# Patient Record
Sex: FEMALE | Race: WHITE | NOT HISPANIC OR LATINO | Employment: FULL TIME | URBAN - NONMETROPOLITAN AREA
[De-identification: names, ages, dates, MRNs, and addresses within clinical notes are randomized per-mention and may not be internally consistent; named-entity substitution may affect disease eponyms.]

---

## 2017-02-23 ENCOUNTER — HOSPITAL ENCOUNTER (EMERGENCY)
Facility: HOSPITAL | Age: 32
Discharge: HOME | End: 2017-02-23
Attending: EMERGENCY MEDICINE | Admitting: EMERGENCY MEDICINE

## 2017-02-23 VITALS
BODY MASS INDEX: 33.13 KG/M2 | DIASTOLIC BLOOD PRESSURE: 85 MMHG | HEART RATE: 90 BPM | WEIGHT: 180 LBS | RESPIRATION RATE: 18 BRPM | HEIGHT: 62 IN | OXYGEN SATURATION: 97 % | SYSTOLIC BLOOD PRESSURE: 127 MMHG | TEMPERATURE: 100.4 F

## 2017-02-23 DIAGNOSIS — J10.1 INFLUENZA A: Primary | ICD-10-CM

## 2017-02-23 PROCEDURE — 87804 INFLUENZA ASSAY W/OPTIC: CPT | Performed by: NURSE PRACTITIONER

## 2017-02-23 PROCEDURE — 99283 EMERGENCY DEPT VISIT LOW MDM: CPT

## 2017-02-23 RX ORDER — CLONIDINE HYDROCHLORIDE 0.1 MG/1
0.1 TABLET ORAL AS NEEDED
COMMUNITY
Start: 2016-04-11 | End: 2018-09-20

## 2017-02-23 RX ORDER — CITALOPRAM 40 MG/1
40 TABLET ORAL DAILY
COMMUNITY
Start: 2016-04-11 | End: 2019-05-08

## 2017-02-23 RX ORDER — OSELTAMIVIR PHOSPHATE 75 MG/1
75 CAPSULE ORAL 2 TIMES DAILY
Qty: 10 CAPSULE | Refills: 0 | Status: SHIPPED | OUTPATIENT
Start: 2017-02-23 | End: 2018-09-20

## 2017-02-23 RX ORDER — LORATADINE 10 MG/1
10 TABLET ORAL AS NEEDED
COMMUNITY
Start: 2016-04-11 | End: 2018-09-20

## 2017-02-23 RX ORDER — OSELTAMIVIR PHOSPHATE 75 MG/1
75 CAPSULE ORAL EVERY 12 HOURS SCHEDULED
Status: DISCONTINUED | OUTPATIENT
Start: 2017-02-23 | End: 2017-02-23

## 2017-02-23 RX ORDER — ALBUTEROL SULFATE 90 UG/1
2 AEROSOL, METERED RESPIRATORY (INHALATION) AS NEEDED
COMMUNITY
Start: 2016-04-11 | End: 2018-09-20

## 2017-02-23 RX ORDER — IBUPROFEN 400 MG/1
400 TABLET ORAL ONCE
Status: COMPLETED | OUTPATIENT
Start: 2017-02-23 | End: 2017-02-23

## 2017-02-23 RX ORDER — IBUPROFEN 600 MG/1
600 TABLET ORAL EVERY 6 HOURS PRN
Qty: 20 TABLET | Refills: 0 | Status: SHIPPED | OUTPATIENT
Start: 2017-02-23 | End: 2018-10-15

## 2017-02-23 RX ADMIN — IBUPROFEN 400 MG: 400 TABLET ORAL at 15:56

## 2017-03-03 LAB
FLUAV AG NPH QL: POSITIVE
FLUBV AG NPH QL IA: NEGATIVE

## 2018-10-15 ENCOUNTER — APPOINTMENT (OUTPATIENT)
Dept: GENERAL RADIOLOGY | Facility: HOSPITAL | Age: 33
End: 2018-10-15

## 2018-10-15 ENCOUNTER — HOSPITAL ENCOUNTER (EMERGENCY)
Facility: HOSPITAL | Age: 33
Discharge: HOME | End: 2018-10-15
Attending: EMERGENCY MEDICINE | Admitting: EMERGENCY MEDICINE

## 2018-10-15 VITALS
OXYGEN SATURATION: 96 % | RESPIRATION RATE: 16 BRPM | DIASTOLIC BLOOD PRESSURE: 87 MMHG | WEIGHT: 215.2 LBS | TEMPERATURE: 97.4 F | BODY MASS INDEX: 39.6 KG/M2 | HEIGHT: 62 IN | SYSTOLIC BLOOD PRESSURE: 122 MMHG | HEART RATE: 71 BPM

## 2018-10-15 DIAGNOSIS — J20.9 ACUTE BRONCHITIS, UNSPECIFIED ORGANISM: Primary | ICD-10-CM

## 2018-10-15 DIAGNOSIS — R09.1 PLEURISY: ICD-10-CM

## 2018-10-15 LAB
ANION GAP SERPL CALCULATED.3IONS-SCNC: 3.8 MMOL/L (ref 10–20)
B-HCG UR QL: NEGATIVE
BASOPHILS # BLD AUTO: 0.1 10*3/MM3 (ref 0–0.2)
BASOPHILS NFR BLD AUTO: 1.2 % (ref 0–2.5)
BUN BLD-MCNC: 13 MG/DL (ref 7–20)
BUN/CREAT SERPL: 21.7 (ref 7.1–23.5)
CALCIUM SPEC-SCNC: 8.3 MG/DL (ref 8.4–10.2)
CHLORIDE SERPL-SCNC: 110 MMOL/L (ref 98–107)
CO2 SERPL-SCNC: 26 MMOL/L (ref 26–30)
CREAT BLD-MCNC: 0.6 MG/DL (ref 0.6–1.3)
D-DIMER, QUANTITATIVE (MAD,POW, STR): 227 NG/ML (FEU) (ref 0–500)
DEPRECATED RDW RBC AUTO: 43.4 FL (ref 37–54)
EOSINOPHIL # BLD AUTO: 1.03 10*3/MM3 (ref 0–0.7)
EOSINOPHIL NFR BLD AUTO: 11.9 % (ref 0–7)
ERYTHROCYTE [DISTWIDTH] IN BLOOD BY AUTOMATED COUNT: 12.5 % (ref 11.5–14.5)
GFR SERPL CREATININE-BSD FRML MDRD: 114 ML/MIN/1.73
GLUCOSE BLD-MCNC: 95 MG/DL (ref 74–98)
HCT VFR BLD AUTO: 41.2 % (ref 37–47)
HGB BLD-MCNC: 13.6 G/DL (ref 12–16)
IMM GRANULOCYTES # BLD: 0.05 10*3/MM3 (ref 0–0.06)
IMM GRANULOCYTES NFR BLD: 0.6 % (ref 0–0.6)
LYMPHOCYTES # BLD AUTO: 1.98 10*3/MM3 (ref 0.6–3.4)
LYMPHOCYTES NFR BLD AUTO: 22.9 % (ref 10–50)
MCH RBC QN AUTO: 31.4 PG (ref 27–31)
MCHC RBC AUTO-ENTMCNC: 33 G/DL (ref 30–37)
MCV RBC AUTO: 95.2 FL (ref 81–99)
MONOCYTES # BLD AUTO: 0.39 10*3/MM3 (ref 0–0.9)
MONOCYTES NFR BLD AUTO: 4.5 % (ref 0–12)
NEUTROPHILS # BLD AUTO: 5.11 10*3/MM3 (ref 2–6.9)
NEUTROPHILS NFR BLD AUTO: 58.9 % (ref 37–80)
NRBC BLD MANUAL-RTO: 0 /100 WBC (ref 0–0)
PLATELET # BLD AUTO: 188 10*3/MM3 (ref 130–400)
PMV BLD AUTO: 10.7 FL (ref 6–12)
POTASSIUM BLD-SCNC: 3.8 MMOL/L (ref 3.5–5.1)
RBC # BLD AUTO: 4.33 10*6/MM3 (ref 4.2–5.4)
SODIUM BLD-SCNC: 136 MMOL/L (ref 137–145)
WBC NRBC COR # BLD: 8.66 10*3/MM3 (ref 4.8–10.8)

## 2018-10-15 PROCEDURE — 81025 URINE PREGNANCY TEST: CPT | Performed by: PHYSICIAN ASSISTANT

## 2018-10-15 PROCEDURE — 71046 X-RAY EXAM CHEST 2 VIEWS: CPT

## 2018-10-15 PROCEDURE — 99283 EMERGENCY DEPT VISIT LOW MDM: CPT

## 2018-10-15 PROCEDURE — 85379 FIBRIN DEGRADATION QUANT: CPT | Performed by: PHYSICIAN ASSISTANT

## 2018-10-15 PROCEDURE — 85025 COMPLETE CBC W/AUTO DIFF WBC: CPT | Performed by: PHYSICIAN ASSISTANT

## 2018-10-15 PROCEDURE — 80048 BASIC METABOLIC PNL TOTAL CA: CPT | Performed by: PHYSICIAN ASSISTANT

## 2018-10-15 RX ORDER — PREDNISONE 20 MG/1
20 TABLET ORAL 2 TIMES DAILY
Qty: 4 TABLET | Refills: 0 | Status: SHIPPED | OUTPATIENT
Start: 2018-10-15 | End: 2019-05-08

## 2018-10-15 RX ORDER — DOXYCYCLINE 100 MG/1
100 CAPSULE ORAL 2 TIMES DAILY
Qty: 14 CAPSULE | Refills: 0 | Status: SHIPPED | OUTPATIENT
Start: 2018-10-15 | End: 2019-05-08

## 2018-10-15 RX ORDER — IPRATROPIUM BROMIDE AND ALBUTEROL SULFATE 2.5; .5 MG/3ML; MG/3ML
3 SOLUTION RESPIRATORY (INHALATION) ONCE
Status: DISCONTINUED | OUTPATIENT
Start: 2018-10-15 | End: 2018-10-15 | Stop reason: HOSPADM

## 2018-10-15 RX ORDER — CETIRIZINE HYDROCHLORIDE 10 MG/1
10 TABLET ORAL DAILY
Qty: 14 TABLET | Refills: 0 | Status: SHIPPED | OUTPATIENT
Start: 2018-10-15 | End: 2019-05-08

## 2018-10-15 RX ORDER — SODIUM CHLORIDE 0.9 % (FLUSH) 0.9 %
10 SYRINGE (ML) INJECTION AS NEEDED
Status: DISCONTINUED | OUTPATIENT
Start: 2018-10-15 | End: 2018-10-15 | Stop reason: HOSPADM

## 2018-10-15 NOTE — ED PROVIDER NOTES
Subjective   The patient is here with complaint of cough some inspiratory pain associated for the past few weeks no fevers does endorse history of some asthma no chest pain patient describes pleuritic type symptoms productive cough some yellow sputum no hemoptysis reported no sharp abdominal pain denies pregnancy presents here for further evaluation        History provided by:  Patient      Review of Systems   Constitutional: Negative.    HENT: Positive for congestion and rhinorrhea.    Eyes: Negative.    Respiratory: Positive for cough and wheezing. Negative for shortness of breath and stridor.    Cardiovascular: Negative.  Negative for leg swelling.   Gastrointestinal: Negative.    Genitourinary: Negative.    Musculoskeletal: Negative.    Skin: Negative.    Neurological: Negative.    Psychiatric/Behavioral: The patient is nervous/anxious.    All other systems reviewed and are negative.      Past Medical History:   Diagnosis Date   • ADD (attention deficit disorder)    • COPD (chronic obstructive pulmonary disease) (CMS/[facility])    • Depression    • Hemorrhoids    • Skin tags, multiple acquired        No Known Allergies    Past Surgical History:   Procedure Laterality Date   • TONSILECTOMY, ADENOIDECTOMY, BILATERAL MYRINGOTOMY AND TUBES     • TUBAL ABDOMINAL LIGATION         Family History   Problem Relation Age of Onset   • No Known Problems Mother    • No Known Problems Father        Social History     Social History   • Marital status: Single     Social History Main Topics   • Smoking status: Current Every Day Smoker     Packs/day: 1.00     Types: Cigarettes   • Smokeless tobacco: Never Used   • Alcohol use No   • Drug use: Yes     Types: Marijuana   • Sexual activity: Defer     Other Topics Concern   • Not on file           Objective   Physical Exam   Constitutional: She is oriented to person, place, and time. She appears well-developed and well-nourished. No distress.   Afebrile vital signs stable ambulatory in  exam room   HENT:   Head: Normocephalic and atraumatic.   Mouth/Throat: Oropharynx is clear and moist.   Eyes: Pupils are equal, round, and reactive to light. Conjunctivae and EOM are normal.   Neck: Normal range of motion. Neck supple. No tracheal deviation present.   Cardiovascular: Normal rate, regular rhythm and intact distal pulses.    Pulmonary/Chest: Effort normal. She has wheezes.   Some slight expiratory wheezes   Abdominal: Soft. There is no tenderness.   Musculoskeletal: Normal range of motion. She exhibits no edema.   Lymphadenopathy:     She has no cervical adenopathy.   Neurological: She is alert and oriented to person, place, and time. No cranial nerve deficit or sensory deficit. She exhibits normal muscle tone. Coordination normal.   Skin: Skin is warm and dry. Capillary refill takes less than 2 seconds. No rash noted. She is not diaphoretic.   Psychiatric: She has a normal mood and affect. Her behavior is normal. Judgment and thought content normal.   Nursing note and vitals reviewed.      Procedures           ED Course  ED Course as of Oct 15 1434   Mon Oct 15, 2018   1406 Patient resting comfortably no acute distress  [SC]   1427 D-dimer within normal limits chest x-ray unremarkable per radiology will treat with antibiotics cough medication follow-up with PCP return here for any problems concerns otherwise  [SC]      ED Course User Index  [SC] Delonte Carballo PA-C                  [facility]      Final diagnoses:   Acute bronchitis, unspecified organism   Pleurisy            Delonte Carballo PA-C  10/15/18 1434

## 2018-10-15 NOTE — ED NOTES
Pt states she doesn't want to have lab work drawn or neb treatment. Pt states is something is wrong with chest xray then she will have lab work done. FRANCOIS Carballo notified      Monse Belcher, RN  10/15/18 9416

## 2019-06-23 VITALS
HEART RATE: 87 BPM | SYSTOLIC BLOOD PRESSURE: 141 MMHG | BODY MASS INDEX: 43.24 KG/M2 | DIASTOLIC BLOOD PRESSURE: 90 MMHG | HEIGHT: 62 IN | TEMPERATURE: 98.4 F | RESPIRATION RATE: 20 BRPM | WEIGHT: 235 LBS | OXYGEN SATURATION: 96 %

## 2019-06-23 PROCEDURE — 99283 EMERGENCY DEPT VISIT LOW MDM: CPT

## 2019-06-24 ENCOUNTER — HOSPITAL ENCOUNTER (EMERGENCY)
Facility: HOSPITAL | Age: 34
Discharge: HOME | End: 2019-06-24
Attending: EMERGENCY MEDICINE | Admitting: EMERGENCY MEDICINE

## 2019-06-24 DIAGNOSIS — J32.0 LEFT MAXILLARY SINUSITIS: ICD-10-CM

## 2019-06-24 DIAGNOSIS — L23.7 POISON IVY DERMATITIS: Primary | ICD-10-CM

## 2019-06-24 PROCEDURE — 63710000001 PREDNISONE PER 5 MG: Performed by: EMERGENCY MEDICINE

## 2019-06-24 RX ORDER — AMOXICILLIN 875 MG/1
875 TABLET, COATED ORAL 2 TIMES DAILY
Qty: 14 TABLET | Refills: 0 | Status: SHIPPED | OUTPATIENT
Start: 2019-06-24 | End: 2019-09-04

## 2019-06-24 RX ORDER — PREDNISONE 20 MG/1
TABLET ORAL
Qty: 10 TABLET | Refills: 0 | Status: SHIPPED | OUTPATIENT
Start: 2019-06-24 | End: 2019-09-04

## 2019-06-24 RX ORDER — AMOXICILLIN 500 MG/1
500 CAPSULE ORAL ONCE
Status: COMPLETED | OUTPATIENT
Start: 2019-06-24 | End: 2019-06-24

## 2019-06-24 RX ADMIN — PREDNISONE 60 MG: 10 TABLET ORAL at 01:12

## 2019-06-24 RX ADMIN — AMOXICILLIN 500 MG: 500 CAPSULE ORAL at 01:12

## 2019-09-04 ENCOUNTER — APPOINTMENT (OUTPATIENT)
Dept: GENERAL RADIOLOGY | Facility: HOSPITAL | Age: 34
End: 2019-09-04

## 2019-09-04 ENCOUNTER — HOSPITAL ENCOUNTER (EMERGENCY)
Facility: HOSPITAL | Age: 34
Discharge: HOME | End: 2019-09-04
Attending: EMERGENCY MEDICINE | Admitting: EMERGENCY MEDICINE

## 2019-09-04 VITALS
SYSTOLIC BLOOD PRESSURE: 126 MMHG | BODY MASS INDEX: 42.33 KG/M2 | OXYGEN SATURATION: 95 % | DIASTOLIC BLOOD PRESSURE: 84 MMHG | TEMPERATURE: 97.6 F | HEART RATE: 78 BPM | WEIGHT: 230 LBS | HEIGHT: 62 IN | RESPIRATION RATE: 18 BRPM

## 2019-09-04 DIAGNOSIS — J44.0 COPD (CHRONIC OBSTRUCTIVE PULMONARY DISEASE) WITH ACUTE BRONCHITIS (HCC): ICD-10-CM

## 2019-09-04 DIAGNOSIS — J20.9 ACUTE BRONCHITIS, UNSPECIFIED ORGANISM: Primary | ICD-10-CM

## 2019-09-04 DIAGNOSIS — J20.9 COPD (CHRONIC OBSTRUCTIVE PULMONARY DISEASE) WITH ACUTE BRONCHITIS (HCC): ICD-10-CM

## 2019-09-04 LAB
ALBUMIN SERPL-MCNC: 4 G/DL (ref 3.5–5.2)
ALBUMIN/GLOB SERPL: 1.3 G/DL
ALP SERPL-CCNC: 62 U/L (ref 39–117)
ALT SERPL W P-5'-P-CCNC: 9 U/L (ref 1–33)
ANION GAP SERPL CALCULATED.3IONS-SCNC: 13.7 MMOL/L (ref 5–15)
AST SERPL-CCNC: 11 U/L (ref 1–32)
B-HCG UR QL: NEGATIVE
BASOPHILS # BLD AUTO: 0.06 10*3/MM3 (ref 0–0.2)
BASOPHILS NFR BLD AUTO: 0.7 % (ref 0–1.5)
BILIRUB SERPL-MCNC: 0.3 MG/DL (ref 0.2–1.2)
BUN BLD-MCNC: 11 MG/DL (ref 6–20)
BUN/CREAT SERPL: 14.7 (ref 7–25)
CALCIUM SPEC-SCNC: 8.5 MG/DL (ref 8.6–10.5)
CHLORIDE SERPL-SCNC: 105 MMOL/L (ref 98–107)
CO2 SERPL-SCNC: 21.3 MMOL/L (ref 22–29)
CREAT BLD-MCNC: 0.75 MG/DL (ref 0.57–1)
D DIMER PPP FEU-MCNC: <0.27 MCGFEU/ML (ref 0–0.57)
DEPRECATED RDW RBC AUTO: 43.4 FL (ref 37–54)
EOSINOPHIL # BLD AUTO: 0.69 10*3/MM3 (ref 0–0.4)
EOSINOPHIL NFR BLD AUTO: 7.6 % (ref 0.3–6.2)
ERYTHROCYTE [DISTWIDTH] IN BLOOD BY AUTOMATED COUNT: 12.7 % (ref 12.3–15.4)
GFR SERPL CREATININE-BSD FRML MDRD: 88 ML/MIN/1.73
GLOBULIN UR ELPH-MCNC: 3.2 GM/DL
GLUCOSE BLD-MCNC: 101 MG/DL (ref 65–99)
HCT VFR BLD AUTO: 42.4 % (ref 34–46.6)
HGB BLD-MCNC: 13.8 G/DL (ref 12–15.9)
IMM GRANULOCYTES # BLD AUTO: 0.05 10*3/MM3 (ref 0–0.05)
IMM GRANULOCYTES NFR BLD AUTO: 0.6 % (ref 0–0.5)
LYMPHOCYTES # BLD AUTO: 2.35 10*3/MM3 (ref 0.7–3.1)
LYMPHOCYTES NFR BLD AUTO: 25.9 % (ref 19.6–45.3)
MCH RBC QN AUTO: 30.1 PG (ref 26.6–33)
MCHC RBC AUTO-ENTMCNC: 32.5 G/DL (ref 31.5–35.7)
MCV RBC AUTO: 92.6 FL (ref 79–97)
MONOCYTES # BLD AUTO: 0.42 10*3/MM3 (ref 0.1–0.9)
MONOCYTES NFR BLD AUTO: 4.6 % (ref 5–12)
NEUTROPHILS # BLD AUTO: 5.52 10*3/MM3 (ref 1.7–7)
NEUTROPHILS NFR BLD AUTO: 60.6 % (ref 42.7–76)
NRBC BLD AUTO-RTO: 0 /100 WBC (ref 0–0.2)
PLATELET # BLD AUTO: 223 10*3/MM3 (ref 140–450)
PMV BLD AUTO: 10.6 FL (ref 6–12)
POTASSIUM BLD-SCNC: 4 MMOL/L (ref 3.5–5.2)
PROT SERPL-MCNC: 7.2 G/DL (ref 6–8.5)
RBC # BLD AUTO: 4.58 10*6/MM3 (ref 3.77–5.28)
SODIUM BLD-SCNC: 140 MMOL/L (ref 136–145)
WBC NRBC COR # BLD: 9.09 10*3/MM3 (ref 3.4–10.8)

## 2019-09-04 PROCEDURE — 94799 UNLISTED PULMONARY SVC/PX: CPT

## 2019-09-04 PROCEDURE — 85379 FIBRIN DEGRADATION QUANT: CPT | Performed by: PHYSICIAN ASSISTANT

## 2019-09-04 PROCEDURE — 80053 COMPREHEN METABOLIC PANEL: CPT | Performed by: PHYSICIAN ASSISTANT

## 2019-09-04 PROCEDURE — 81025 URINE PREGNANCY TEST: CPT | Performed by: PHYSICIAN ASSISTANT

## 2019-09-04 PROCEDURE — 96374 THER/PROPH/DIAG INJ IV PUSH: CPT

## 2019-09-04 PROCEDURE — 85025 COMPLETE CBC W/AUTO DIFF WBC: CPT | Performed by: PHYSICIAN ASSISTANT

## 2019-09-04 PROCEDURE — 71046 X-RAY EXAM CHEST 2 VIEWS: CPT

## 2019-09-04 PROCEDURE — 94640 AIRWAY INHALATION TREATMENT: CPT

## 2019-09-04 PROCEDURE — 99283 EMERGENCY DEPT VISIT LOW MDM: CPT

## 2019-09-04 PROCEDURE — 25010000002 METHYLPREDNISOLONE PER 40 MG: Performed by: PHYSICIAN ASSISTANT

## 2019-09-04 RX ORDER — AZITHROMYCIN 250 MG/1
TABLET, FILM COATED ORAL
Qty: 6 TABLET | Refills: 0 | OUTPATIENT
Start: 2019-09-04 | End: 2019-11-11

## 2019-09-04 RX ORDER — ALBUTEROL SULFATE 90 UG/1
2 AEROSOL, METERED RESPIRATORY (INHALATION) EVERY 4 HOURS PRN
Qty: 18 G | Refills: 0 | Status: SHIPPED | OUTPATIENT
Start: 2019-09-04 | End: 2019-11-11 | Stop reason: SDUPTHER

## 2019-09-04 RX ORDER — METHYLPREDNISOLONE SODIUM SUCCINATE 40 MG/ML
80 INJECTION, POWDER, LYOPHILIZED, FOR SOLUTION INTRAMUSCULAR; INTRAVENOUS ONCE
Status: COMPLETED | OUTPATIENT
Start: 2019-09-04 | End: 2019-09-04

## 2019-09-04 RX ORDER — IPRATROPIUM BROMIDE AND ALBUTEROL SULFATE 2.5; .5 MG/3ML; MG/3ML
3 SOLUTION RESPIRATORY (INHALATION) ONCE
Status: COMPLETED | OUTPATIENT
Start: 2019-09-04 | End: 2019-09-04

## 2019-09-04 RX ORDER — PREDNISONE 20 MG/1
20 TABLET ORAL 2 TIMES DAILY
Qty: 8 TABLET | Refills: 0 | Status: SHIPPED | OUTPATIENT
Start: 2019-09-04 | End: 2019-11-11 | Stop reason: SDUPTHER

## 2019-09-04 RX ORDER — SODIUM CHLORIDE 0.9 % (FLUSH) 0.9 %
10 SYRINGE (ML) INJECTION AS NEEDED
Status: DISCONTINUED | OUTPATIENT
Start: 2019-09-04 | End: 2019-09-04 | Stop reason: HOSPADM

## 2019-09-04 RX ADMIN — IPRATROPIUM BROMIDE AND ALBUTEROL SULFATE 3 ML: .5; 3 SOLUTION RESPIRATORY (INHALATION) at 17:42

## 2019-09-04 RX ADMIN — METHYLPREDNISOLONE SODIUM SUCCINATE 80 MG: 40 INJECTION, POWDER, FOR SOLUTION INTRAMUSCULAR; INTRAVENOUS at 18:26

## 2019-09-04 NOTE — ED PROVIDER NOTES
Subjective   The patient is here with complaint of some cough symptoms more so for the past couple of months some drainage to her throat, no abdominal pain no vomiting or diarrhea reported denies pregnancy patient does use a rescue inhaler as needed has been using it more frequently patient states in the past she has been tested for allergies but not anytime recently presents here for further evaluation  Patient does continue to smoke cigarettes... Denies pregnancy        History provided by:  Patient      Review of Systems   Constitutional: Negative.  Negative for fever.   HENT: Positive for congestion, postnasal drip, rhinorrhea and sore throat. Negative for trouble swallowing.    Eyes: Negative.    Respiratory: Positive for cough.         No Hemoptysis reported   Cardiovascular: Negative.    Gastrointestinal: Negative.    Genitourinary: Negative.    Musculoskeletal: Negative.    Skin: Negative.    Neurological: Negative.    Psychiatric/Behavioral: Negative.    All other systems reviewed and are negative.      Past Medical History:   Diagnosis Date   • ADD (attention deficit disorder)    • COPD (chronic obstructive pulmonary disease) (CMS/[facility])    • Depression    • Hemorrhoids    • Skin tags, multiple acquired        No Known Allergies    Past Surgical History:   Procedure Laterality Date   • TONSILECTOMY, ADENOIDECTOMY, BILATERAL MYRINGOTOMY AND TUBES     • TUBAL ABDOMINAL LIGATION         Family History   Problem Relation Age of Onset   • No Known Problems Mother    • No Known Problems Father        Social History     Socioeconomic History   • Marital status: Single     Spouse name: Not on file   • Number of children: Not on file   • Years of education: Not on file   • Highest education level: Not on file   Tobacco Use   • Smoking status: Current Every Day Smoker     Packs/day: 1.00     Types: Cigarettes   • Smokeless tobacco: Never Used   Substance and Sexual Activity   • Alcohol use: No   • Drug use: Yes      Types: Marijuana   • Sexual activity: Defer           Objective   Physical Exam   Constitutional: She is oriented to person, place, and time. She appears well-developed and well-nourished.   Afebrile vital signs stable no acute distress   HENT:   Head: Normocephalic.   Right Ear: External ear normal.   Left Ear: External ear normal.   Mouth/Throat: Oropharynx is clear and moist. No oropharyngeal exudate.   Posterior pharynx clear uvula is midline no airway compromise   Eyes: Conjunctivae and EOM are normal. Pupils are equal, round, and reactive to light.   Neck: Normal range of motion. Neck supple.   Cardiovascular: Normal rate, regular rhythm and intact distal pulses.   Pulmonary/Chest: Effort normal. She has rales.   Abdominal: Soft. There is no tenderness.   Musculoskeletal: Normal range of motion. She exhibits no edema.   Lymphadenopathy:     She has no cervical adenopathy.   Neurological: She is alert and oriented to person, place, and time. No cranial nerve deficit or sensory deficit. She exhibits normal muscle tone. Coordination normal.   Skin: Skin is warm and dry. Capillary refill takes less than 2 seconds. No rash noted. No erythema.   Psychiatric: She has a normal mood and affect. Her behavior is normal. Judgment and thought content normal.   Nursing note and vitals reviewed.      Procedures           ED Course  ED Course as of Sep 04 1915   Wed Sep 04, 2019   1859 Is resting comfortably feels better no acute distress have had discussion with patient regarding follow-up with [facility], pulmonology for recurring COPD symptoms recommend follow-up with Dr. Leyva... As far as the postnasal drainage and symptoms will recommend follow-up with allergist .. And ear nose and throat Dr. Bowles... Patient agreeable with plan certainly return here for any problems with follow-up any worsening symptom concerns have had lengthy discussion regarding smoking cessation... Nevertheless we will treat with  antibiotics steroids inhalers cough medication  [SC]      ED Course User Index  [SC] Delonte Carballo PA-C         Final Diagnosis: as of Sep 04 1915   Acute bronchitis, unspecified organism   COPD (chronic obstructive pulmonary disease) with acute bronchitis (CMS/[facility])                  MDM  Number of Diagnoses or Management Options     Amount and/or Complexity of Data Reviewed  Review and summarize past medical records: yes  Discuss the patient with other providers: yes    Risk of Complications, Morbidity, and/or Mortality  Presenting problems: moderate  Diagnostic procedures: low  Management options: low                 Delonte Carballo PA-C  09/04/19 1915

## 2019-11-11 ENCOUNTER — HOSPITAL ENCOUNTER (EMERGENCY)
Facility: HOSPITAL | Age: 34
Discharge: HOME | End: 2019-11-11
Attending: EMERGENCY MEDICINE | Admitting: EMERGENCY MEDICINE

## 2019-11-11 VITALS
BODY MASS INDEX: 44.31 KG/M2 | WEIGHT: 240.8 LBS | HEART RATE: 84 BPM | OXYGEN SATURATION: 94 % | DIASTOLIC BLOOD PRESSURE: 78 MMHG | RESPIRATION RATE: 20 BRPM | SYSTOLIC BLOOD PRESSURE: 123 MMHG | TEMPERATURE: 97.8 F | HEIGHT: 62 IN

## 2019-11-11 DIAGNOSIS — J20.9 ACUTE BRONCHITIS, UNSPECIFIED ORGANISM: Primary | ICD-10-CM

## 2019-11-11 PROCEDURE — 99282 EMERGENCY DEPT VISIT SF MDM: CPT

## 2019-11-11 RX ORDER — BROMPHENIRAMINE MALEATE, PSEUDOEPHEDRINE HYDROCHLORIDE, AND DEXTROMETHORPHAN HYDROBROMIDE 2; 30; 10 MG/5ML; MG/5ML; MG/5ML
5 SYRUP ORAL 4 TIMES DAILY PRN
Qty: 120 ML | Refills: 0 | OUTPATIENT
Start: 2019-11-11 | End: 2020-10-27

## 2019-11-11 RX ORDER — ALBUTEROL SULFATE 90 UG/1
2 AEROSOL, METERED RESPIRATORY (INHALATION) EVERY 4 HOURS PRN
Qty: 18 G | Refills: 0 | Status: SHIPPED | OUTPATIENT
Start: 2019-11-11

## 2019-11-11 RX ORDER — PREDNISONE 20 MG/1
20 TABLET ORAL 2 TIMES DAILY
Qty: 10 TABLET | Refills: 0 | Status: SHIPPED | OUTPATIENT
Start: 2019-11-11 | End: 2020-01-18 | Stop reason: SDUPTHER

## 2019-11-11 NOTE — ED PROVIDER NOTES
Subjective   History of Present Illness  This a 34-year-old female comes in today complaining of cough and congestion with shortness of breath and wheezing for the past 6 months.  She reports that she has black mold growing in her home and is unable to move at the time.  She states that she has had ongoing respiratory symptoms since she found this.  Is been here multiple times for treatment.  She has been referred to pulmonology but does not go.  She has also been referred back to her primary care provider for further testing but she states she went there and had to wait for an hour and a half and did not want to wait anymore so she left to come back here.  She denies any fever chills nausea or vomiting.  She is a smoker.  She is here today because she needs a refill on her albuterol inhaler and would like some cough syrup because Robitussin over-the-counter is not working for her.  Review of Systems   Constitutional: Negative.    HENT: Negative.    Eyes: Negative.    Respiratory: Positive for cough and wheezing.    Cardiovascular: Negative.    Gastrointestinal: Negative.    Endocrine: Negative.    Genitourinary: Negative.    Musculoskeletal: Negative.    Skin: Negative.    Allergic/Immunologic: Negative.    Neurological: Negative.    Hematological: Negative.    Psychiatric/Behavioral: Negative.    All other systems reviewed and are negative.      Past Medical History:   Diagnosis Date   • ADD (attention deficit disorder)    • COPD (chronic obstructive pulmonary disease) (CMS/[facility])    • Depression    • Hemorrhoids    • Skin tags, multiple acquired        No Known Allergies    Past Surgical History:   Procedure Laterality Date   • TONSILECTOMY, ADENOIDECTOMY, BILATERAL MYRINGOTOMY AND TUBES     • TUBAL ABDOMINAL LIGATION         Family History   Problem Relation Age of Onset   • No Known Problems Mother    • No Known Problems Father        Social History     Socioeconomic History   • Marital status: Single     Spouse  name: Not on file   • Number of children: Not on file   • Years of education: Not on file   • Highest education level: Not on file   Tobacco Use   • Smoking status: Current Every Day Smoker     Packs/day: 1.00     Types: Cigarettes   • Smokeless tobacco: Never Used   Substance and Sexual Activity   • Alcohol use: No   • Drug use: Yes     Types: Marijuana   • Sexual activity: Defer           Objective   Physical Exam   Constitutional: She appears well-developed and well-nourished.   Nursing note and vitals reviewed.  GEN: No acute distress  Head: Normocephalic, atraumatic  Eyes: Pupils equal round reactive to light  ENT: Posterior pharynx normal in appearance, oral mucosa is moist  Chest: Nontender to palpation  Cardiovascular: Regular rate  Lungs: Wheezing throughout with expiratory cough.  Abdomen: Soft, nontender, nondistended, no peritoneal signs  Extremities: No edema, normal appearance  Neuro: GCS 15  Psych: Mood and affect are appropriate      Procedures           ED Course                  MDM  Number of Diagnoses or Management Options  Diagnosis management comments: I have advised the need for follow-up with a pulmonologist because this is been an ongoing issue.  Since this has been going on for 6 months and she has had multiple antibiotics I do not feel antibiotics would help at this point.  She is afebrile.  We will give her an albuterol inhaler and a round of steroids and I have advised her strongly to stop smoking and to try to get rid of the mold in her home.       Amount and/or Complexity of Data Reviewed  Review and summarize past medical records: yes  Discuss the patient with other providers: yes    Risk of Complications, Morbidity, and/or Mortality  Presenting problems: low  Diagnostic procedures: minimal  Management options: low        Final diagnoses:   Acute bronchitis, unspecified organism              Yara Acevedo, APRN  11/11/19 1700

## 2020-01-18 ENCOUNTER — HOSPITAL ENCOUNTER (EMERGENCY)
Facility: HOSPITAL | Age: 35
Discharge: HOME | End: 2020-01-18
Attending: EMERGENCY MEDICINE | Admitting: EMERGENCY MEDICINE

## 2020-01-18 ENCOUNTER — APPOINTMENT (OUTPATIENT)
Dept: GENERAL RADIOLOGY | Facility: HOSPITAL | Age: 35
End: 2020-01-18

## 2020-01-18 VITALS
HEART RATE: 89 BPM | WEIGHT: 239 LBS | RESPIRATION RATE: 20 BRPM | TEMPERATURE: 98 F | BODY MASS INDEX: 43.98 KG/M2 | OXYGEN SATURATION: 92 % | SYSTOLIC BLOOD PRESSURE: 123 MMHG | DIASTOLIC BLOOD PRESSURE: 78 MMHG | HEIGHT: 62 IN

## 2020-01-18 DIAGNOSIS — J44.1 COPD EXACERBATION (HCC): Primary | ICD-10-CM

## 2020-01-18 LAB
A-A DO2: 107.2 MMHG
ALBUMIN SERPL-MCNC: 4.4 G/DL (ref 3.5–5.2)
ALBUMIN/GLOB SERPL: 1.4 G/DL
ALP SERPL-CCNC: 71 U/L (ref 39–117)
ALT SERPL W P-5'-P-CCNC: 12 U/L (ref 1–33)
ANION GAP SERPL CALCULATED.3IONS-SCNC: 14.3 MMOL/L (ref 5–15)
ARTERIAL PATENCY WRIST A: ABNORMAL
AST SERPL-CCNC: 12 U/L (ref 1–32)
ATMOSPHERIC PRESS: 732 MMHG
BASE EXCESS BLDA CALC-SCNC: -1.9 MMOL/L (ref 0–2)
BASOPHILS # BLD AUTO: 0.09 10*3/MM3 (ref 0–0.2)
BASOPHILS NFR BLD AUTO: 0.8 % (ref 0–1.5)
BDY SITE: ABNORMAL
BILIRUB SERPL-MCNC: 0.5 MG/DL (ref 0.2–1.2)
BUN BLD-MCNC: 13 MG/DL (ref 6–20)
BUN/CREAT SERPL: 15.5 (ref 7–25)
CALCIUM SPEC-SCNC: 9.2 MG/DL (ref 8.6–10.5)
CHLORIDE SERPL-SCNC: 106 MMOL/L (ref 98–107)
CO2 SERPL-SCNC: 21.7 MMOL/L (ref 22–29)
COHGB MFR BLD: 0.7 % (ref 0–2)
CREAT BLD-MCNC: 0.84 MG/DL (ref 0.57–1)
DEPRECATED RDW RBC AUTO: 46 FL (ref 37–54)
EOSINOPHIL # BLD AUTO: 1.04 10*3/MM3 (ref 0–0.4)
EOSINOPHIL NFR BLD AUTO: 9.1 % (ref 0.3–6.2)
ERYTHROCYTE [DISTWIDTH] IN BLOOD BY AUTOMATED COUNT: 13.2 % (ref 12.3–15.4)
FLUAV AG NPH QL: NEGATIVE
FLUBV AG NPH QL IA: NEGATIVE
GAS FLOW AIRWAY: 3 LPM
GFR SERPL CREATININE-BSD FRML MDRD: 77 ML/MIN/1.73
GLOBULIN UR ELPH-MCNC: 3.1 GM/DL
GLUCOSE BLD-MCNC: 101 MG/DL (ref 65–99)
HCG SERPL QL: NEGATIVE
HCO3 BLDA-SCNC: 22.4 MMOL/L (ref 22–28)
HCT VFR BLD AUTO: 45 % (ref 34–46.6)
HCT VFR BLD CALC: 44 %
HGB BLD-MCNC: 14.6 G/DL (ref 12–15.9)
HGB BLDA-MCNC: 14.3 G/DL (ref 12–18)
HOLD SPECIMEN: NORMAL
HOLD SPECIMEN: NORMAL
HOROWITZ INDEX BLD+IHG-RTO: 32 %
IMM GRANULOCYTES # BLD AUTO: 0.07 10*3/MM3 (ref 0–0.05)
IMM GRANULOCYTES NFR BLD AUTO: 0.6 % (ref 0–0.5)
LYMPHOCYTES # BLD AUTO: 2.11 10*3/MM3 (ref 0.7–3.1)
LYMPHOCYTES NFR BLD AUTO: 18.5 % (ref 19.6–45.3)
MCH RBC QN AUTO: 30.7 PG (ref 26.6–33)
MCHC RBC AUTO-ENTMCNC: 32.4 G/DL (ref 31.5–35.7)
MCV RBC AUTO: 94.7 FL (ref 79–97)
METHGB BLD QL: 0.8 % (ref 0–1.5)
MODALITY: ABNORMAL
MONOCYTES # BLD AUTO: 0.54 10*3/MM3 (ref 0.1–0.9)
MONOCYTES NFR BLD AUTO: 4.7 % (ref 5–12)
NEUTROPHILS # BLD AUTO: 7.54 10*3/MM3 (ref 1.7–7)
NEUTROPHILS NFR BLD AUTO: 66.3 % (ref 42.7–76)
NOTE: ABNORMAL
NRBC BLD AUTO-RTO: 0 /100 WBC (ref 0–0.2)
NT-PROBNP SERPL-MCNC: 7.2 PG/ML (ref 5–450)
OXYHGB MFR BLDV: 93.4 % (ref 94–99)
PCO2 BLDA: 36 MM HG (ref 35–45)
PCO2 TEMP ADJ BLD: ABNORMAL MM[HG]
PH BLDA: 7.4 PH UNITS (ref 7.3–7.5)
PH, TEMP CORRECTED: ABNORMAL
PLATELET # BLD AUTO: 248 10*3/MM3 (ref 140–450)
PMV BLD AUTO: 10.2 FL (ref 6–12)
PO2 BLDA: 72 MM HG (ref 75–100)
PO2 TEMP ADJ BLD: ABNORMAL MM[HG]
POTASSIUM BLD-SCNC: 4.5 MMOL/L (ref 3.5–5.2)
PROT SERPL-MCNC: 7.5 G/DL (ref 6–8.5)
RBC # BLD AUTO: 4.75 10*6/MM3 (ref 3.77–5.28)
SAO2 % BLDCOA: 94.8 % (ref 94–100)
SODIUM BLD-SCNC: 142 MMOL/L (ref 136–145)
TROPONIN T SERPL-MCNC: <0.01 NG/ML (ref 0–0.03)
VENTILATOR MODE: ABNORMAL
WBC NRBC COR # BLD: 11.39 10*3/MM3 (ref 3.4–10.8)
WHOLE BLOOD HOLD SPECIMEN: NORMAL
WHOLE BLOOD HOLD SPECIMEN: NORMAL

## 2020-01-18 PROCEDURE — 94640 AIRWAY INHALATION TREATMENT: CPT

## 2020-01-18 PROCEDURE — 94799 UNLISTED PULMONARY SVC/PX: CPT

## 2020-01-18 PROCEDURE — 87804 INFLUENZA ASSAY W/OPTIC: CPT | Performed by: EMERGENCY MEDICINE

## 2020-01-18 PROCEDURE — 83880 ASSAY OF NATRIURETIC PEPTIDE: CPT | Performed by: EMERGENCY MEDICINE

## 2020-01-18 PROCEDURE — 82375 ASSAY CARBOXYHB QUANT: CPT

## 2020-01-18 PROCEDURE — 25010000002 MAGNESIUM SULFATE 2 GM/50ML SOLUTION: Performed by: EMERGENCY MEDICINE

## 2020-01-18 PROCEDURE — 84703 CHORIONIC GONADOTROPIN ASSAY: CPT | Performed by: EMERGENCY MEDICINE

## 2020-01-18 PROCEDURE — 96365 THER/PROPH/DIAG IV INF INIT: CPT

## 2020-01-18 PROCEDURE — 82805 BLOOD GASES W/O2 SATURATION: CPT

## 2020-01-18 PROCEDURE — 84484 ASSAY OF TROPONIN QUANT: CPT | Performed by: EMERGENCY MEDICINE

## 2020-01-18 PROCEDURE — 96366 THER/PROPH/DIAG IV INF ADDON: CPT

## 2020-01-18 PROCEDURE — 96375 TX/PRO/DX INJ NEW DRUG ADDON: CPT

## 2020-01-18 PROCEDURE — 80053 COMPREHEN METABOLIC PANEL: CPT | Performed by: EMERGENCY MEDICINE

## 2020-01-18 PROCEDURE — 25010000002 METHYLPREDNISOLONE PER 125 MG: Performed by: EMERGENCY MEDICINE

## 2020-01-18 PROCEDURE — 93005 ELECTROCARDIOGRAM TRACING: CPT | Performed by: EMERGENCY MEDICINE

## 2020-01-18 PROCEDURE — 36600 WITHDRAWAL OF ARTERIAL BLOOD: CPT

## 2020-01-18 PROCEDURE — 99284 EMERGENCY DEPT VISIT MOD MDM: CPT

## 2020-01-18 PROCEDURE — 71045 X-RAY EXAM CHEST 1 VIEW: CPT

## 2020-01-18 PROCEDURE — 83050 HGB METHEMOGLOBIN QUAN: CPT

## 2020-01-18 PROCEDURE — 85025 COMPLETE CBC W/AUTO DIFF WBC: CPT | Performed by: EMERGENCY MEDICINE

## 2020-01-18 RX ORDER — IPRATROPIUM BROMIDE AND ALBUTEROL SULFATE 2.5; .5 MG/3ML; MG/3ML
3 SOLUTION RESPIRATORY (INHALATION) ONCE
Status: COMPLETED | OUTPATIENT
Start: 2020-01-18 | End: 2020-01-18

## 2020-01-18 RX ORDER — METHYLPREDNISOLONE SODIUM SUCCINATE 125 MG/2ML
125 INJECTION, POWDER, LYOPHILIZED, FOR SOLUTION INTRAMUSCULAR; INTRAVENOUS ONCE
Status: COMPLETED | OUTPATIENT
Start: 2020-01-18 | End: 2020-01-18

## 2020-01-18 RX ORDER — MAGNESIUM SULFATE HEPTAHYDRATE 40 MG/ML
2 INJECTION, SOLUTION INTRAVENOUS ONCE
Status: COMPLETED | OUTPATIENT
Start: 2020-01-18 | End: 2020-01-18

## 2020-01-18 RX ORDER — BUDESONIDE AND FORMOTEROL FUMARATE DIHYDRATE 160; 4.5 UG/1; UG/1
2 AEROSOL RESPIRATORY (INHALATION)
Qty: 6 G | Refills: 0 | Status: SHIPPED | OUTPATIENT
Start: 2020-01-18 | End: 2020-10-27 | Stop reason: SDUPTHER

## 2020-01-18 RX ORDER — AZITHROMYCIN 500 MG/1
500 TABLET, FILM COATED ORAL DAILY
Qty: 5 TABLET | Refills: 0 | Status: SHIPPED | OUTPATIENT
Start: 2020-01-18 | End: 2020-01-23

## 2020-01-18 RX ORDER — SODIUM CHLORIDE 0.9 % (FLUSH) 0.9 %
10 SYRINGE (ML) INJECTION AS NEEDED
Status: DISCONTINUED | OUTPATIENT
Start: 2020-01-18 | End: 2020-01-18 | Stop reason: HOSPADM

## 2020-01-18 RX ORDER — PREDNISONE 20 MG/1
20 TABLET ORAL 2 TIMES DAILY
Qty: 10 TABLET | Refills: 0 | Status: SHIPPED | OUTPATIENT
Start: 2020-01-18 | End: 2020-01-23

## 2020-01-18 RX ADMIN — METHYLPREDNISOLONE SODIUM SUCCINATE 125 MG: 125 INJECTION, POWDER, FOR SOLUTION INTRAMUSCULAR; INTRAVENOUS at 16:27

## 2020-01-18 RX ADMIN — MAGNESIUM SULFATE HEPTAHYDRATE 2 G: 40 INJECTION, SOLUTION INTRAVENOUS at 18:53

## 2020-01-18 RX ADMIN — IPRATROPIUM BROMIDE AND ALBUTEROL SULFATE 3 ML: .5; 3 SOLUTION RESPIRATORY (INHALATION) at 19:01

## 2020-01-18 RX ADMIN — IPRATROPIUM BROMIDE AND ALBUTEROL SULFATE 3 ML: .5; 3 SOLUTION RESPIRATORY (INHALATION) at 16:28

## 2020-01-18 NOTE — ED PROVIDER NOTES
Subjective   History of Present Illness    Chief Complaint: Shortness of breath wheezing  History of Present Illness: 34-year-old female with history of COPD and asthma, presents with above complaint states uses rescue inhaler at home.  No fevers  Onset: Ongoing issue, worse in the last 2 to 3 days  Duration: Persistent  Exacerbating / Alleviating factors: Use of home medicines without relief  Associated symptoms: None      Nurses Notes reviewed and agree, including vitals, allergies, social history and prior medical history.     REVIEW OF SYSTEMS: All systems reviewed and not pertinent unless noted.    Positive for: Shortness of breath wheezing    Negative for: Fever hemoptysis syncope chest pain  Review of Systems    Past Medical History:   Diagnosis Date   • ADD (attention deficit disorder)    • COPD (chronic obstructive pulmonary disease) (CMS/[facility])    • Depression    • Hemorrhoids    • Skin tags, multiple acquired        No Known Allergies    Past Surgical History:   Procedure Laterality Date   • TONSILECTOMY, ADENOIDECTOMY, BILATERAL MYRINGOTOMY AND TUBES     • TUBAL ABDOMINAL LIGATION         Family History   Problem Relation Age of Onset   • No Known Problems Mother    • No Known Problems Father        Social History     Socioeconomic History   • Marital status: Single     Spouse name: Not on file   • Number of children: Not on file   • Years of education: Not on file   • Highest education level: Not on file   Tobacco Use   • Smoking status: Current Every Day Smoker     Packs/day: 1.00     Types: Cigarettes   • Smokeless tobacco: Never Used   Substance and Sexual Activity   • Alcohol use: No   • Drug use: Yes     Types: Marijuana   • Sexual activity: Defer           Objective   Physical Exam      GENERAL APPEARANCE: Well developed, well nourished, in no acute distress.  VITAL SIGNS: per nursing, reviewed and noted  SKIN: no rashes, ulcerations or petechiae.  Head: Normocephalic, atraumatic.   EYES: perrla.  EOMI.  ENT:  TM clear, posterior pharynx patent.  LUNGS: Extensive diffuse wheezes.  No tachypnea.  CARDIOVASCULAR:  regular rate and rhythm, no murmurs.  Good Peripheral pulses.  ABDOMEN: Soft, nontender, normal bowel sounds. No hernia. No ascites.  MUSCULOSKELETAL:  No tenderness. Full ROM. Strength and tone normal.  NEUROLOGIC: Alert, oriented x 3. No gross deficits.   NECK: Supple, symmetric. No tenderness, no masses. Full ROM  Back: full rom, no paraspinal spasm. No CVA tenderness.   PSYCH: appropriate affect,   : no bladder tenderness or distention, no CVA tenderness      Procedures     No attending provider procedures were performed      ED Course  ED Course as of Jan 19 0449   Sat Jan 18, 2020 1714 Influenza A Ag, EIA: Negative [PF]   1714 Influenza B Ag, EIA: Negative [PF]   1714 proBNP: 7.2 [PF]   1714 Troponin T: <0.010 [PF]   1714 Glucose(!): 101 [PF]   1714 BUN: 13 [PF]   1714 Creatinine: 0.84 [PF]   1714 Sodium: 142 [PF]   1714 Potassium: 4.5 [PF]   1714 Chloride: 106 [PF]   1714 CO2(!): 21.7 [PF]   1714 Calcium: 9.2 [PF]   1714 Total Protein: 7.5 [PF]   1714 Albumin: 4.40 [PF]   1714 ALT (SGPT): 12 [PF]   1714 AST (SGOT): 12 [PF]   1714 Alkaline Phosphatase: 71 [PF]   1714 WBC(!): 11.39 [PF]   1714 Hemoglobin: 14.6 [PF]   1714 Hematocrit: 45.0 [PF]   1714 Platelets: 248 [PF]   1806 Chest x-ray interpreted by me shows no evidence of any cardiomegaly, effusion, infiltrate, or bony abnormality.    [PF]   1810 EKG interpreted by me reveals sinus rhythm rate 91.  No ectopy no ischemic changes.    [PF]      ED Course User Index  [PF] Carlos Bo DO      4:49 AM   Patient still wheezing after steroids and neb treatment.  No obvious chest x-ray findings.  Will add repeat nebs, magnesium, blood gas, reassess                                      MDM    Final diagnoses:   COPD exacerbation (CMS/[facility])       Stephan Umanzor DO    I received this patient on signout from Dr. Bo.  Breathing treatments were  pending.  ABG were pending.  ABG was without significant abnormality.  After DuoNeb's I reevaluated patient she was resting comfortably.  She still had some faint wheezing but she had good aeration of the bilateral lung fields.  Pulse ox of 94% on room air.  X-ray was negative for acute process.  Cardiac work-up was negative.  Patient is appropriate for discharge with treatment as a COPD exacerbation.  She is agreeable to this plan.  Strict return precautions given.       Stephan Umanzor, DO  01/19/20 0449

## 2020-01-18 NOTE — ED NOTES
Pt O2 was 88% after coughing, O2 @ 2L applied via NC. O2 94%.     Karen Gilbert, RN  01/18/20 1600

## 2020-10-27 ENCOUNTER — HOSPITAL ENCOUNTER (EMERGENCY)
Facility: HOSPITAL | Age: 35
Discharge: HOME | End: 2020-10-27
Attending: EMERGENCY MEDICINE | Admitting: EMERGENCY MEDICINE

## 2020-10-27 ENCOUNTER — APPOINTMENT (OUTPATIENT)
Dept: GENERAL RADIOLOGY | Facility: HOSPITAL | Age: 35
End: 2020-10-27

## 2020-10-27 VITALS
OXYGEN SATURATION: 97 % | WEIGHT: 240 LBS | RESPIRATION RATE: 18 BRPM | TEMPERATURE: 98.2 F | SYSTOLIC BLOOD PRESSURE: 124 MMHG | HEIGHT: 62 IN | BODY MASS INDEX: 44.16 KG/M2 | HEART RATE: 76 BPM | DIASTOLIC BLOOD PRESSURE: 87 MMHG

## 2020-10-27 DIAGNOSIS — R05.9 COUGH: Primary | ICD-10-CM

## 2020-10-27 PROCEDURE — 94640 AIRWAY INHALATION TREATMENT: CPT

## 2020-10-27 PROCEDURE — 99283 EMERGENCY DEPT VISIT LOW MDM: CPT

## 2020-10-27 PROCEDURE — 94799 UNLISTED PULMONARY SVC/PX: CPT

## 2020-10-27 PROCEDURE — 71046 X-RAY EXAM CHEST 2 VIEWS: CPT

## 2020-10-27 RX ORDER — IPRATROPIUM BROMIDE AND ALBUTEROL SULFATE 2.5; .5 MG/3ML; MG/3ML
3 SOLUTION RESPIRATORY (INHALATION) ONCE
Status: COMPLETED | OUTPATIENT
Start: 2020-10-27 | End: 2020-10-27

## 2020-10-27 RX ORDER — METHYLPREDNISOLONE SODIUM SUCCINATE 125 MG/2ML
125 INJECTION, POWDER, LYOPHILIZED, FOR SOLUTION INTRAMUSCULAR; INTRAVENOUS ONCE
Status: DISCONTINUED | OUTPATIENT
Start: 2020-10-27 | End: 2020-10-27

## 2020-10-27 RX ORDER — BUDESONIDE AND FORMOTEROL FUMARATE DIHYDRATE 160; 4.5 UG/1; UG/1
2 AEROSOL RESPIRATORY (INHALATION)
Qty: 10.2 G | Refills: 0 | OUTPATIENT
Start: 2020-10-27 | End: 2021-10-27

## 2020-10-27 RX ORDER — MAGNESIUM SULFATE HEPTAHYDRATE 40 MG/ML
2 INJECTION, SOLUTION INTRAVENOUS ONCE
Status: DISCONTINUED | OUTPATIENT
Start: 2020-10-27 | End: 2020-10-27

## 2020-10-27 RX ORDER — SODIUM CHLORIDE 0.9 % (FLUSH) 0.9 %
10 SYRINGE (ML) INJECTION AS NEEDED
Status: DISCONTINUED | OUTPATIENT
Start: 2020-10-27 | End: 2020-10-27

## 2020-10-27 RX ADMIN — IPRATROPIUM BROMIDE AND ALBUTEROL SULFATE 3 ML: .5; 3 SOLUTION RESPIRATORY (INHALATION) at 11:30

## 2021-04-16 ENCOUNTER — HOSPITAL ENCOUNTER (EMERGENCY)
Facility: HOSPITAL | Age: 36
Discharge: HOME | End: 2021-04-16
Attending: EMERGENCY MEDICINE | Admitting: EMERGENCY MEDICINE

## 2021-04-16 VITALS
OXYGEN SATURATION: 99 % | DIASTOLIC BLOOD PRESSURE: 108 MMHG | TEMPERATURE: 98.5 F | RESPIRATION RATE: 18 BRPM | WEIGHT: 197.6 LBS | HEART RATE: 118 BPM | BODY MASS INDEX: 36.36 KG/M2 | HEIGHT: 62 IN | SYSTOLIC BLOOD PRESSURE: 160 MMHG

## 2021-04-16 DIAGNOSIS — Z76.89 ENCOUNTER FOR PSYCHIATRIC ASSESSMENT: Primary | ICD-10-CM

## 2021-04-16 PROCEDURE — 99284 EMERGENCY DEPT VISIT MOD MDM: CPT
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
## 2021-10-27 PROCEDURE — U0004 COV-19 TEST NON-CDC HGH THRU: HCPCS | Performed by: FAMILY MEDICINE

## 2021-12-11 ENCOUNTER — APPOINTMENT (OUTPATIENT)
Dept: CT IMAGING | Facility: HOSPITAL | Age: 36
End: 2021-12-11

## 2021-12-11 ENCOUNTER — APPOINTMENT (OUTPATIENT)
Dept: GENERAL RADIOLOGY | Facility: HOSPITAL | Age: 36
End: 2021-12-11

## 2021-12-11 ENCOUNTER — HOSPITAL ENCOUNTER (EMERGENCY)
Facility: HOSPITAL | Age: 36
Discharge: HOME | End: 2021-12-11
Attending: EMERGENCY MEDICINE | Admitting: EMERGENCY MEDICINE

## 2021-12-11 VITALS
WEIGHT: 210 LBS | SYSTOLIC BLOOD PRESSURE: 134 MMHG | OXYGEN SATURATION: 98 % | RESPIRATION RATE: 20 BRPM | TEMPERATURE: 98.4 F | HEART RATE: 90 BPM | BODY MASS INDEX: 38.64 KG/M2 | DIASTOLIC BLOOD PRESSURE: 101 MMHG | HEIGHT: 62 IN

## 2021-12-11 DIAGNOSIS — R06.02 SHORTNESS OF BREATH: ICD-10-CM

## 2021-12-11 DIAGNOSIS — F15.10 METHAMPHETAMINE ABUSE (HCC): ICD-10-CM

## 2021-12-11 DIAGNOSIS — R20.2 PARESTHESIA: Primary | ICD-10-CM

## 2021-12-11 LAB
ALBUMIN SERPL-MCNC: 4.4 G/DL (ref 3.5–5.2)
ALBUMIN/GLOB SERPL: 1.6 G/DL
ALP SERPL-CCNC: 58 U/L (ref 39–117)
ALT SERPL W P-5'-P-CCNC: 16 U/L (ref 1–33)
AMPHET+METHAMPHET UR QL: POSITIVE
AMPHETAMINES UR QL: POSITIVE
ANION GAP SERPL CALCULATED.3IONS-SCNC: 12.5 MMOL/L (ref 5–15)
AST SERPL-CCNC: 16 U/L (ref 1–32)
BARBITURATES UR QL SCN: NEGATIVE
BASOPHILS # BLD AUTO: 0.02 10*3/MM3 (ref 0–0.2)
BASOPHILS NFR BLD AUTO: 0.5 % (ref 0–1.5)
BENZODIAZ UR QL SCN: NEGATIVE
BILIRUB SERPL-MCNC: 1 MG/DL (ref 0–1.2)
BUN SERPL-MCNC: 12 MG/DL (ref 6–20)
BUN/CREAT SERPL: 15.4 (ref 7–25)
BUPRENORPHINE SERPL-MCNC: NEGATIVE NG/ML
CALCIUM SPEC-SCNC: 9.3 MG/DL (ref 8.6–10.5)
CANNABINOIDS SERPL QL: NEGATIVE
CHLORIDE SERPL-SCNC: 105 MMOL/L (ref 98–107)
CO2 SERPL-SCNC: 23.5 MMOL/L (ref 22–29)
COCAINE UR QL: NEGATIVE
CREAT SERPL-MCNC: 0.78 MG/DL (ref 0.57–1)
D-LACTATE SERPL-SCNC: 1.5 MMOL/L (ref 0.5–2)
DEPRECATED RDW RBC AUTO: 44.6 FL (ref 37–54)
EOSINOPHIL # BLD AUTO: 0.11 10*3/MM3 (ref 0–0.4)
EOSINOPHIL NFR BLD AUTO: 2.9 % (ref 0.3–6.2)
ERYTHROCYTE [DISTWIDTH] IN BLOOD BY AUTOMATED COUNT: 13.8 % (ref 12.3–15.4)
GFR SERPL CREATININE-BSD FRML MDRD: 83 ML/MIN/1.73
GLOBULIN UR ELPH-MCNC: 2.8 GM/DL
GLUCOSE SERPL-MCNC: 101 MG/DL (ref 65–99)
HCG SERPL QL: NEGATIVE
HCT VFR BLD AUTO: 40.3 % (ref 34–46.6)
HGB BLD-MCNC: 13.5 G/DL (ref 12–15.9)
HOLD SPECIMEN: NORMAL
IMM GRANULOCYTES # BLD AUTO: 0.01 10*3/MM3 (ref 0–0.05)
IMM GRANULOCYTES NFR BLD AUTO: 0.3 % (ref 0–0.5)
LYMPHOCYTES # BLD AUTO: 1.62 10*3/MM3 (ref 0.7–3.1)
LYMPHOCYTES NFR BLD AUTO: 42.5 % (ref 19.6–45.3)
MAGNESIUM SERPL-MCNC: 1.8 MG/DL (ref 1.6–2.6)
MCH RBC QN AUTO: 29.6 PG (ref 26.6–33)
MCHC RBC AUTO-ENTMCNC: 33.5 G/DL (ref 31.5–35.7)
MCV RBC AUTO: 88.4 FL (ref 79–97)
METHADONE UR QL SCN: NEGATIVE
MONOCYTES # BLD AUTO: 0.33 10*3/MM3 (ref 0.1–0.9)
MONOCYTES NFR BLD AUTO: 8.7 % (ref 5–12)
NEUTROPHILS NFR BLD AUTO: 1.72 10*3/MM3 (ref 1.7–7)
NEUTROPHILS NFR BLD AUTO: 45.1 % (ref 42.7–76)
NRBC BLD AUTO-RTO: 0 /100 WBC (ref 0–0.2)
NT-PROBNP SERPL-MCNC: <5 PG/ML (ref 0–450)
OPIATES UR QL: NEGATIVE
OXYCODONE UR QL SCN: NEGATIVE
PCP UR QL SCN: NEGATIVE
PLATELET # BLD AUTO: 188 10*3/MM3 (ref 140–450)
PMV BLD AUTO: 11.2 FL (ref 6–12)
POTASSIUM SERPL-SCNC: 3.3 MMOL/L (ref 3.5–5.2)
PROCALCITONIN SERPL-MCNC: 0.04 NG/ML (ref 0–0.25)
PROPOXYPH UR QL: NEGATIVE
PROT SERPL-MCNC: 7.2 G/DL (ref 6–8.5)
RBC # BLD AUTO: 4.56 10*6/MM3 (ref 3.77–5.28)
SARS-COV-2 RNA PNL SPEC NAA+PROBE: NOT DETECTED
SODIUM SERPL-SCNC: 141 MMOL/L (ref 136–145)
TRICYCLICS UR QL SCN: NEGATIVE
TROPONIN T SERPL-MCNC: <0.01 NG/ML (ref 0–0.03)
WBC NRBC COR # BLD: 3.81 10*3/MM3 (ref 3.4–10.8)
WHOLE BLOOD HOLD SPECIMEN: NORMAL
WHOLE BLOOD HOLD SPECIMEN: NORMAL

## 2021-12-11 PROCEDURE — 83735 ASSAY OF MAGNESIUM: CPT | Performed by: PHYSICIAN ASSISTANT

## 2021-12-11 PROCEDURE — 71260 CT THORAX DX C+: CPT

## 2021-12-11 PROCEDURE — 83605 ASSAY OF LACTIC ACID: CPT | Performed by: PHYSICIAN ASSISTANT

## 2021-12-11 PROCEDURE — 25010000002 IOPAMIDOL 61 % SOLUTION: Performed by: EMERGENCY MEDICINE

## 2021-12-11 PROCEDURE — 84145 PROCALCITONIN (PCT): CPT | Performed by: PHYSICIAN ASSISTANT

## 2021-12-11 PROCEDURE — 93005 ELECTROCARDIOGRAM TRACING: CPT

## 2021-12-11 PROCEDURE — 83880 ASSAY OF NATRIURETIC PEPTIDE: CPT

## 2021-12-11 PROCEDURE — 84703 CHORIONIC GONADOTROPIN ASSAY: CPT

## 2021-12-11 PROCEDURE — 70496 CT ANGIOGRAPHY HEAD: CPT

## 2021-12-11 PROCEDURE — 87635 SARS-COV-2 COVID-19 AMP PRB: CPT | Performed by: PHYSICIAN ASSISTANT

## 2021-12-11 PROCEDURE — 87040 BLOOD CULTURE FOR BACTERIA: CPT | Performed by: PHYSICIAN ASSISTANT

## 2021-12-11 PROCEDURE — 84484 ASSAY OF TROPONIN QUANT: CPT

## 2021-12-11 PROCEDURE — 85025 COMPLETE CBC W/AUTO DIFF WBC: CPT

## 2021-12-11 PROCEDURE — 70498 CT ANGIOGRAPHY NECK: CPT

## 2021-12-11 PROCEDURE — 71045 X-RAY EXAM CHEST 1 VIEW: CPT

## 2021-12-11 PROCEDURE — 70450 CT HEAD/BRAIN W/O DYE: CPT

## 2021-12-11 PROCEDURE — 74177 CT ABD & PELVIS W/CONTRAST: CPT

## 2021-12-11 PROCEDURE — 80306 DRUG TEST PRSMV INSTRMNT: CPT | Performed by: PHYSICIAN ASSISTANT

## 2021-12-11 PROCEDURE — 80053 COMPREHEN METABOLIC PANEL: CPT

## 2021-12-11 PROCEDURE — 99283 EMERGENCY DEPT VISIT LOW MDM: CPT

## 2021-12-11 RX ORDER — SODIUM CHLORIDE 0.9 % (FLUSH) 0.9 %
10 SYRINGE (ML) INJECTION AS NEEDED
Status: DISCONTINUED | OUTPATIENT
Start: 2021-12-11 | End: 2021-12-11 | Stop reason: HOSPADM

## 2021-12-11 RX ORDER — POTASSIUM CHLORIDE 750 MG/1
40 CAPSULE, EXTENDED RELEASE ORAL ONCE
Status: COMPLETED | OUTPATIENT
Start: 2021-12-11 | End: 2021-12-11

## 2021-12-11 RX ADMIN — SODIUM CHLORIDE 1000 ML: 9 INJECTION, SOLUTION INTRAVENOUS at 19:24

## 2021-12-11 RX ADMIN — IOPAMIDOL 100 ML: 612 INJECTION, SOLUTION INTRAVENOUS at 17:26

## 2021-12-11 RX ADMIN — POTASSIUM CHLORIDE 40 MEQ: 10 CAPSULE, COATED, EXTENDED RELEASE ORAL at 19:24

## 2021-12-11 NOTE — ED PROVIDER NOTES
Subjective   History of Present Illness   Patient is a 36-year-old female with history of COPD and substance abuse presenting to the ER with complaints of shortness of breath, dizziness, and numbness to her face and extremities per nursing.  Patient did admit to marijuana and meth use today.  However, upon my evaluation the patient is not responding.  She is laying in the bed with her eyes open with labored breathing and will not respond to questions.  Patient was sent for super stat head CT.    Review of Systems   Respiratory: Positive for shortness of breath.    Neurological: Positive for dizziness and numbness.   All other systems reviewed and are negative.      Past Medical History:   Diagnosis Date   • ADD (attention deficit disorder)    • COPD (chronic obstructive pulmonary disease) ([facility])    • Depression    • Hemorrhoids    • Skin tags, multiple acquired        No Known Allergies    Past Surgical History:   Procedure Laterality Date   • TONSILECTOMY, ADENOIDECTOMY, BILATERAL MYRINGOTOMY AND TUBES     • TUBAL ABDOMINAL LIGATION         Family History   Problem Relation Age of Onset   • No Known Problems Mother    • No Known Problems Father        Social History     Socioeconomic History   • Marital status: Single   Tobacco Use   • Smoking status: Current Every Day Smoker     Packs/day: 1.00     Types: Cigarettes   • Smokeless tobacco: Never Used   Vaping Use   • Vaping Use: Never used   Substance and Sexual Activity   • Alcohol use: No   • Drug use: Yes     Types: Methamphetamines, Marijuana   • Sexual activity: Defer           Objective   Physical Exam  Vitals and nursing note reviewed.   Constitutional:       Appearance: She is ill-appearing.   HENT:      Head: Normocephalic and atraumatic.      Right Ear: External ear normal.      Left Ear: External ear normal.      Nose: Nose normal.   Cardiovascular:      Rate and Rhythm: Tachycardia present.   Pulmonary:      Comments: Labored breathing  Abdominal:       General: There is no distension.      Palpations: Abdomen is soft.   Skin:     General: Skin is warm and dry.   Neurological:      Mental Status: She is alert.      Comments: Upon my evaluation, patient is not responding with labored breathing, nonverbal, not following commands    Re-evaluation: Patient A&OX4, no focal neurologic deficits, speaking and eating without difficulty   Psychiatric:      Comments: Patient is intoxicated, bizarre behavior          Procedures           ED Course  ED Course as of 12/11/21 2109   Sat Dec 11, 2021   1803 EKG interpreted by me reveals sinus tachycardia with rate of 111 bpm.  There is low QRS voltage in chest leads.  This is an abnormal appearing EKG. [TB]   1846 After returning from scans, patient is AAO X 3. Requesting food. Speaking without difficulty. Moving all extremities without difficulty. NIHSS 0. I spoke with Dr. Montgomery with Neurology who states that given the patient's return to baseline mental status and benign imaging and work-up, he does not feel this is a stroke. He states that from his standpoint, the patient does not need further work-up for stroke. [AP]   1854 Procalcitonin: 0.04 [AP]   1854 Lactate: 1.5 [AP]   1854 Magnesium: 1.8 [AP]   1854 proBNP: <5.0 [AP]   1854 HCG Qualitative: Negative [AP]   1854 Troponin T: <0.010 [AP]   1854 Glucose(!): 101 [AP]   1854 BUN: 12 [AP]   1854 Creatinine: 0.78 [AP]   1854 Sodium: 141 [AP]   1854 Potassium(!): 3.3 [AP]   1854 Chloride: 105 [AP]   1854 CO2: 23.5 [AP]   1854 Calcium: 9.3 [AP]   1854 Total Protein: 7.2 [AP]   1854 Albumin: 4.40 [AP]   1854 ALT (SGPT): 16 [AP]   1854 AST (SGOT): 16 [AP]   1854 Alkaline Phosphatase: 58 [AP]   1854 Total Bilirubin: 1.0 [AP]   1854 eGFR Non  Am: 83 [AP]   1854 Globulin: 2.8 [AP]   1854 A/G Ratio: 1.6 [AP]   1854 BUN/Creatinine Ratio: 15.4 [AP]   1854 Anion Gap: 12.5 [AP]   1854 WBC: 3.81 [AP]   1854 RBC: 4.56 [AP]   1854 Hemoglobin: 13.5 [AP]   1854 Hematocrit: 40.3 [AP]    1854 Platelets: 188 [AP]   1855 Narrative & Impression  FINAL REPORT     TECHNIQUE:  Routine axial images through the head were obtained without  contrast.     CLINICAL HISTORY:  Neuro deficit, acute, stroke suspected     FINDINGS:  The ventricles are normal.  There is no mass or other abnormal  hypodensity.  There is no shift of midline structures.  There is  no intracranial hemorrhage.  No acute sinus or osseous  abnormality is seen.     IMPRESSION:  Unremarkable.     Authenticated by Jordan Starr M.D. on 12/11/2021 05:53:25 PM          Specimen Collected: 12/11/21 17:53         [AP]   1855 Narrative & Impression  FINAL REPORT     TECHNIQUE:  Axial images through the neck were obtained following IV  contrast administration.     CLINICAL HISTORY:  AMS     FINDINGS:  The aortic arch and the carotid arteries are normal as are the  bilateral and vertebral arteries.     IMPRESSION:  Unremarkable.     Authenticated by Jordan Starr M.D. on 12/11/2021 05:57:56 PM          Specimen Collected: 12/11/21 17:57         [AP]   1856 Narrative & Impression  FINAL REPORT     TECHNIQUE:  Routine axial images through the abdomen and pelvis were  obtained following IV contrast administration.     CLINICAL HISTORY:  substance abuse, AMS     FINDINGS:  Abdomen: The gallbladder is normal.  The solid abdominal organs  and ureters are unremarkable.  The GI tract is unremarkable,  including the appendix.  Pelvis: The uterus, ovaries, and  urinary bladder are unremarkable.   There is no pelvic or  abdominal ascites, adenopathy or acute osseous abnormality.     IMPRESSION:  Unremarkable.     Authenticated by Jordan Starr M.D. on 12/11/2021 06:25:19 PM          Specimen Collected: 12/11/21 18:25         [AP]   1856 Narrative & Impression  FINAL REPORT     TECHNIQUE:  Axial images through the neck were obtained following IV  contrast administration.     CLINICAL HISTORY:  AMS     FINDINGS:  The aortic arch and the carotid arteries are normal as are  the  bilateral and vertebral arteries.     IMPRESSION:  Unremarkable.     Authenticated by Jordan Starr M.D. on 12/11/2021 05:57:56 PM          Specimen Collected: 12/11/21 17:57         [AP]   1857 Narrative & Impression  FINAL REPORT     TECHNIQUE:  Thin section axial images were obtained through the chest and  during the arterial phase of IV contrast administration. Coronal  3-D MIP reconstructed images were also provided.     CLINICAL HISTORY:  PE suspected, high prob     FINDINGS:  The pulmonary arteries are not optimally opacified and there is  no filling defect to indicate pulmonary embolism. The thoracic  aorta is normal. The lungs are clear. There is no pleural  disease, adenopathy or significant osseous abnormality.     IMPRESSION:  No pulmonary embolism.  No acute disease identified.     Authenticated by Jordan Starr M.D. on 12/11/2021 06:27:29 PM          Specimen Collected: 12/11/21 18:27 Last Resulted: 12/11/21 18:27           [AP]   1935 COVID19: Not Detected [AP]   1958 Methamphetamine, Ur(!): Positive [AP]   1958 Amphetamine, Urine Qual(!): Positive [AP]      ED Course User Index  [AP] Sherrill Shelton PA-C  [TB] Mandi Campbell MD                                                 [facility]   Patient was evaluated in the ER for concern for stroke due to paresthesias and behavioral disturbances.  Patient's vitals are within normal limits throughout ER visit.  On initial presentation, patient was not responding to questions and not following commands.  She was sent for stat head CT.  Labs are unremarkable other than very mild hypokalemia.  Patient was given oral potassium replacement.  Magnesium was normal.  CT of head and CTA of head and neck were without acute abnormalities.  CT of abdomen and chest revealed no PE or acute abnormality.  After returning from scans, patient was completely alert and oriented with no focal neurologic deficits.  She requested a sandwich and was eating without difficulty, using  her phone.  UDS is positive for methamphetamines.  Patient does admit to meth and marijuana use earlier today.  This is likely the etiology of her symptoms.  Dr. Montgomery with neurology was consulted and reports no need for further stroke work-up.  Patient was concerned that she had Covid due to a positive exposure.  Her Covid test was negative.  She was advised to follow-up with her PCP.  Precautions were given for return to the ER for any new or worsening symptoms.    Final diagnoses:   Paresthesia   Methamphetamine abuse (HCC)   Shortness of breath       ED Disposition  ED Disposition     ED Disposition Condition Comment    Discharge Stable           Provider, No Known  [facility] [id number]  [phone]    Schedule an appointment as soon as possible for a visit       [facility] Emergency Department  [address] [address] [phone]  Go to   As needed, If symptoms worsen         Medication List      No changes were made to your prescriptions during this visit.          Sherrill Shelton PA-C  12/11/21 0150

## 2021-12-11 NOTE — ED NOTES
Sherrill PARRISH at bedside to assess pt having neurological changes, difficulty speaking, ordered stat head CT. Radiology called x 2, no answer. Medic Student went to CT to notify radiology.      Amanda Clancy, KARIS  12/11/21 1640

## 2021-12-11 NOTE — ED NOTES
Dr. Montgomery called at this time per TESFAYE Mccartney. Call transferred.     Renee Duncan  12/11/21 0991

## 2021-12-11 NOTE — ED NOTES
Pt states it is ok to update mother     Klaus Espinal, RN  12/11/21 9929     Bladder non-tender and non-distended. Urine clear yellow.

## 2021-12-16 LAB
BACTERIA SPEC AEROBE CULT: NORMAL
BACTERIA SPEC AEROBE CULT: NORMAL

## 2022-01-03 ENCOUNTER — HOSPITAL ENCOUNTER (EMERGENCY)
Facility: HOSPITAL | Age: 37
Discharge: LEFT BEFORE DISCHARGE | End: 2022-01-03

## 2022-01-03 VITALS
BODY MASS INDEX: 36.8 KG/M2 | WEIGHT: 200 LBS | TEMPERATURE: 97.9 F | DIASTOLIC BLOOD PRESSURE: 101 MMHG | HEART RATE: 117 BPM | SYSTOLIC BLOOD PRESSURE: 131 MMHG | HEIGHT: 62 IN | RESPIRATION RATE: 20 BRPM | OXYGEN SATURATION: 96 %

## 2022-01-03 PROCEDURE — 99211 OFF/OP EST MAY X REQ PHY/QHP: CPT

## 2022-08-14 ENCOUNTER — HOSPITAL ENCOUNTER (EMERGENCY)
Facility: HOSPITAL | Age: 37
Discharge: LEFT BEFORE DISCHARGE | End: 2022-08-14

## 2022-08-14 PROCEDURE — 99211 OFF/OP EST MAY X REQ PHY/QHP: CPT

## 2024-12-31 ENCOUNTER — HOSPITAL ENCOUNTER (EMERGENCY)
Facility: HOSPITAL | Age: 39
Discharge: HOME | End: 2024-12-31
Attending: EMERGENCY MEDICINE | Admitting: EMERGENCY MEDICINE
Payer: MEDICAID

## 2024-12-31 VITALS
WEIGHT: 200 LBS | RESPIRATION RATE: 18 BRPM | SYSTOLIC BLOOD PRESSURE: 153 MMHG | TEMPERATURE: 98.1 F | HEART RATE: 99 BPM | OXYGEN SATURATION: 100 % | BODY MASS INDEX: 36.8 KG/M2 | HEIGHT: 62 IN | DIASTOLIC BLOOD PRESSURE: 86 MMHG

## 2024-12-31 DIAGNOSIS — R23.9 SKIN COMPLAINTS: Primary | ICD-10-CM

## 2024-12-31 PROCEDURE — 99282 EMERGENCY DEPT VISIT SF MDM: CPT | Performed by: EMERGENCY MEDICINE

## 2024-12-31 RX ORDER — PERMETHRIN 50 MG/G
1 CREAM TOPICAL ONCE
Qty: 1 G | Refills: 0 | Status: SHIPPED | OUTPATIENT
Start: 2024-12-31 | End: 2024-12-31

## 2024-12-31 RX ORDER — GINSENG 100 MG
1 CAPSULE ORAL 2 TIMES DAILY
Qty: 14 G | Refills: 0 | Status: SHIPPED | OUTPATIENT
Start: 2024-12-31 | End: 2024-12-31

## 2024-12-31 RX ORDER — GINSENG 100 MG
1 CAPSULE ORAL 2 TIMES DAILY
Qty: 14 G | Refills: 0 | Status: SHIPPED | OUTPATIENT
Start: 2024-12-31 | End: 2025-01-05

## 2024-12-31 NOTE — ED PROVIDER NOTES
" EMERGENCY DEPARTMENT ENCOUNTER    Pt Name: Nicole Kim  MRN: [id number]  Pt :   1985  Room Number:  CDU1/01  Date of encounter:  2024  PCP: Provider, No Known  ED Provider: Kofi Lozano PA-C    Historian: Patient      HPI:  Chief Complaint   Patient presents with    Psychiatric Evaluation     Pt reports removing 2 \"parasites from ear and arm.\" Pt states she still has parasite in her ears. Pt brought said insect in plastic bag.           Context: Nicole Kim is a 39 y.o. female who presents to the ED c/o \"there are parasites in my skin.\"  Patient states an of each forearm on the volar aspect she has pulled out a small black which she believes to be a parasite.  Also has been digging at her bilateral external ears and what she thought were blackheads that she now is concerned of parasites.  No other complaints.      PAST MEDICAL HISTORY  Past Medical History:   Diagnosis Date    ADD (attention deficit disorder)     COPD (chronic obstructive pulmonary disease)     Depression     Hemorrhoids     Skin tags, multiple acquired          PAST SURGICAL HISTORY  Past Surgical History:   Procedure Laterality Date    TONSILECTOMY, ADENOIDECTOMY, BILATERAL MYRINGOTOMY AND TUBES      TUBAL ABDOMINAL LIGATION           FAMILY HISTORY  Family History   Problem Relation Age of Onset    No Known Problems Mother     No Known Problems Father          SOCIAL HISTORY  Social History     Socioeconomic History    Marital status: Single   Tobacco Use    Smoking status: Every Day     Current packs/day: 1.00     Types: Cigarettes    Smokeless tobacco: Never   Vaping Use    Vaping status: Never Used   Substance and Sexual Activity    Alcohol use: No    Drug use: Yes     Types: Methamphetamines, Marijuana    Sexual activity: Defer         ALLERGIES  Patient has no known allergies.        REVIEW OF SYSTEMS  Review of Systems   Constitutional: Negative.    HENT: Negative.     Eyes: Negative.  " "  Respiratory: Negative.     Cardiovascular: Negative.    Gastrointestinal: Negative.    Genitourinary: Negative.    Musculoskeletal: Negative.    Skin:         \"There are parasites in my skin\"   Neurological: Negative.    Psychiatric/Behavioral: Negative.          All systems reviewed and negative except for those discussed in HPI.       PHYSICAL EXAM    I have reviewed the triage vital signs and nursing notes.    ED Triage Vitals [12/31/24 1724]   Temp Heart Rate Resp BP SpO2   -- 99 18 153/86 100 %      Temp src Heart Rate Source Patient Position BP Location FiO2 (%)   -- -- -- -- --       Physical Exam  Vitals and nursing note reviewed.   Constitutional:       General: She is not in acute distress.     Appearance: Normal appearance. She is obese. She is not ill-appearing, toxic-appearing or diaphoretic.   HENT:      Head: Normocephalic and atraumatic.      Nose: Nose normal.   Eyes:      Extraocular Movements: Extraocular movements intact.   Cardiovascular:      Rate and Rhythm: Normal rate.   Pulmonary:      Effort: Pulmonary effort is normal.   Abdominal:      General: Abdomen is flat.   Musculoskeletal:         General: Normal range of motion.      Cervical back: Normal range of motion.   Skin:     General: Skin is warm and dry.      Comments: Some scabbed areas to the bilateral external ears with mild overlying erythema.  No abscess.  No significant cellulitis.  To the bilateral forearms there are several scars to the areas where patient states she has dug out parasites there is no visible abnormality.   Neurological:      General: No focal deficit present.      Mental Status: She is alert.   Psychiatric:         Mood and Affect: Mood normal.         Behavior: Behavior normal.            LAB RESULTS  No results found for this or any previous visit (from the past 24 hours).    If labs were ordered, I independently reviewed the results and considered them in treating the patient.        RADIOLOGY  No Radiology " Exams Resulted Within Past 24 Hours        PROCEDURES    Procedures    Interpretations    O2 Sat: The patients oxygen saturation was 100% on Room Air.  This was independently interpreted by me as Normal    MEDICATIONS GIVEN IN ER    Medications - No data to display      MEDICAL DECISION MAKING, PROGRESS, and CONSULTS    All labs, if obtained, have been independently reviewed by me.  All radiology studies, if obtained, have been reviewed by me and the radiologist dictating the report.  All EKG's, if obtained, have been independently viewed and interpreted by me      Discussion below represents my analysis of pertinent findings related to patient's condition, differential diagnosis, treatment plan and final disposition.      Differential diagnosis:    Delusional parasitosis, scab/eschar    Additional Sources:  External (non-ED) record review:  Past urgent treatment notes noting methamphetamine abuse       Orders placed during this visit:  No orders of the defined types were placed in this encounter.        Additional orders considered but not ordered:  None    ED Course:    Consultants:  None             After my consideration of clinical presentation and any laboratory/radiology studies obtained, I discussed the findings with the patient/patient representative who is in agreement with the treatment plan and the final disposition. Risks and benefits of discharge were discussed.     AS OF 17:51 EST VITALS:    BP - 153/86  HR - 99  TEMP -    O2 SATS - 100%    I reviewed the patients prescription monitoring report if available prior to discharge    DIAGNOSIS  Final diagnoses:   Skin complaints         DISPOSITION  ED Disposition       ED Disposition   Discharge    Condition   Stable    Comment   --                   Please note that portions of this document were completed with voice recognition software.        Kofi Lozano PA-C  12/31/24 0876

## 2025-01-10 ENCOUNTER — HOSPITAL ENCOUNTER (EMERGENCY)
Facility: HOSPITAL | Age: 40
Discharge: HOME | End: 2025-01-10
Attending: STUDENT IN AN ORGANIZED HEALTH CARE EDUCATION/TRAINING PROGRAM
Payer: MEDICAID

## 2025-01-10 VITALS
OXYGEN SATURATION: 100 % | DIASTOLIC BLOOD PRESSURE: 93 MMHG | WEIGHT: 199.96 LBS | TEMPERATURE: 98.3 F | HEART RATE: 102 BPM | BODY MASS INDEX: 36.8 KG/M2 | HEIGHT: 62 IN | SYSTOLIC BLOOD PRESSURE: 158 MMHG | RESPIRATION RATE: 19 BRPM

## 2025-01-10 DIAGNOSIS — R23.9 SKIN COMPLAINTS: Primary | ICD-10-CM

## 2025-01-10 PROCEDURE — 99282 EMERGENCY DEPT VISIT SF MDM: CPT | Performed by: STUDENT IN AN ORGANIZED HEALTH CARE EDUCATION/TRAINING PROGRAM

## 2025-01-10 PROCEDURE — 25010000002 DEXAMETHASONE SODIUM PHOSPHATE 10 MG/ML SOLUTION

## 2025-01-10 PROCEDURE — 96372 THER/PROPH/DIAG INJ SC/IM: CPT

## 2025-01-10 RX ORDER — DIPHENHYDRAMINE HCL 25 MG
50 CAPSULE ORAL ONCE
Status: DISCONTINUED | OUTPATIENT
Start: 2025-01-10 | End: 2025-01-10 | Stop reason: HOSPADM

## 2025-01-10 RX ORDER — DEXAMETHASONE SODIUM PHOSPHATE 10 MG/ML
10 INJECTION, SOLUTION INTRAMUSCULAR; INTRAVENOUS ONCE
Status: COMPLETED | OUTPATIENT
Start: 2025-01-10 | End: 2025-01-10

## 2025-01-10 RX ADMIN — DEXAMETHASONE SODIUM PHOSPHATE 10 MG: 10 INJECTION INTRAMUSCULAR; INTRAVENOUS at 18:23

## 2025-01-10 NOTE — ED PROVIDER NOTES
"Subjective  History of Present Illness:    Patient is a 39-year-old female, history of ADD, COPD, depression, hemorrhoids, skin tags, multiple acquired, presenting today for evaluation of rash at various sites of her body, complaining of itching, patient seen as recently as 12/31/2024 for evaluation of \"parasites from ear and arm\" here in this emergency department.  Was given permethrin, patient reports that it has continued to become itchy and this did not help.  Patient reports that they are extremely itchy.  Has multiple Band-Aids on from areas that she has scratched off without surrounding signs of cellulitis.  She has been digging at her bilateral arms and ears.  She reports that she has parasites that are little black dots coming out of her ears and eyes, arms, abdomen, and legs.  No new exposures.  Denies lesions to the soles or palms of the hand or feet. no fevers.  Patient does not endorse any new antibiotic use.  Patient does not endorse any chest pain or shortness of air, ongoing for multiple weeks now.  Patient admits to a prior history of methamphetamine use but when asked about any recent use she became extremely agitated asking for my full name and NPI number.       Nurses Notes reviewed and agree, including vitals, allergies, social history and prior medical history.     REVIEW OF SYSTEMS: All systems reviewed and not pertinent unless noted.  Review of Systems   Constitutional:  Negative for fever.   Skin:  Positive for rash.   All other systems reviewed and are negative.      Past Medical History:   Diagnosis Date    ADD (attention deficit disorder)     COPD (chronic obstructive pulmonary disease)     Depression     Hemorrhoids     Skin tags, multiple acquired        Allergies:    Patient has no known allergies.      Past Surgical History:   Procedure Laterality Date    TONSILECTOMY, ADENOIDECTOMY, BILATERAL MYRINGOTOMY AND TUBES      TUBAL ABDOMINAL LIGATION           Social History " "    Socioeconomic History    Marital status: Single   Tobacco Use    Smoking status: Every Day     Current packs/day: 1.00     Types: Cigarettes    Smokeless tobacco: Never   Vaping Use    Vaping status: Never Used   Substance and Sexual Activity    Alcohol use: No    Drug use: Yes     Types: Methamphetamines, Marijuana    Sexual activity: Defer         Family History   Problem Relation Age of Onset    No Known Problems Mother     No Known Problems Father        Objective  Physical Exam:  /93 (Patient Position: Sitting)   Pulse 102   Temp 98.3 °F (36.8 °C) (Tympanic)   Resp 19   Ht 157.5 cm (62\")   Wt 90.7 kg (199 lb 15.3 oz)   LMP 12/25/2024 (Approximate)   SpO2 100%   BMI 36.57 kg/m²      Physical Exam  Vitals and nursing note reviewed.   Constitutional:       General: She is not in acute distress.     Appearance: Normal appearance. She is not ill-appearing, toxic-appearing or diaphoretic.   HENT:      Nose: Nose normal.      Comments: Otoscopic evaluation of the nares reveals bilateral nasal abrasions and mild ulceration of skin on the septum without septal hematoma.     Mouth/Throat:      Mouth: Mucous membranes are moist.      Pharynx: Oropharynx is clear.   Eyes:      General:         Right eye: No discharge.         Left eye: No discharge.      Extraocular Movements: Extraocular movements intact.      Conjunctiva/sclera: Conjunctivae normal.      Pupils: Pupils are equal, round, and reactive to light.      Comments: No parasites seen in the conjunctive or sclera.   Cardiovascular:      Pulses: Normal pulses.      Comments: Appears well-perfused  Pulmonary:      Effort: Pulmonary effort is normal. No respiratory distress.   Abdominal:      General: Abdomen is flat.      Comments: Scattered areas of scabs on the abdomen.   Musculoskeletal:      Cervical back: Normal range of motion.   Skin:     General: Skin is warm and dry.      Capillary Refill: Capillary refill takes less than 2 seconds.      " Comments: outer ears, right worse than left on the lobe with multiple skin abrasions.  Various areas of scabs scattered on the upper extremities and abdomen.  No secondary signs of skin infection or cellulitis, no abscess.     Neurological:      General: No focal deficit present.      Mental Status: She is alert and oriented to person, place, and time.   Psychiatric:      Comments: Agitated               Procedures    ED Course:         Lab Results (last 24 hours)       ** No results found for the last 24 hours. **             No radiology results from the last 24 hrs       MDM      Initial impression of presenting illness: This is a 39-year-old female presented for evaluation of rash    DDX: includes but is not limited to: Cellulitis, abrasion, parasitic infection, allergic reaction, abscess, carbuncle, infected hair follicle, folliculitis, dermatitis, delusional parasitosis, others    Patient arrives hemodynamically stable afebrile mildly tachycardic at a rate of 102 nonischemic nonhypoxic with vitals interpreted by myself.     Pertinent features from physical exam: Otoscopic evaluation of the nares reveals bilateral nasal abrasions and mild ulceration of skin on the septum without septal hematoma..  Sclera and conjunctive appeared normal, outer ears, right worse than left on the lobe with multiple skin abrasions.  Various areas of scabs scattered on the upper extremities and abdomen.  No secondary signs of skin infection or cellulitis, no abscess.  Patient showed me her right ear several times insistent that there was a parasite here on the outer lobe.     Initial diagnostic plan: No labs or imaging indicated this time    Results from initial plan were reviewed and interpreted by me revealing N/A    Diagnostic information from other sources: Record reviewed    Interventions / Re-evaluation:   Medications   diphenhydrAMINE (BENADRYL) capsule 50 mg (50 mg Oral Not Given 1/10/25 1820)   dexAMETHasone sodium  phosphate injection 10 mg (10 mg Intramuscular Given 1/10/25 182)       Results/clinical rationale were discussed with patient at bedside.  Patient was asked if she was given follow-up with dermatology at last visit to the emergency department, she notes that there was absolutely no information about follow-up with dermatology although I reviewed her discharge summary which notes a follow-up with [facility] in her discharge paperwork.  After full history and physical, patient was asked about prior history of methamphetamine use, she endorsed prior use, but when asked about any active use she became irate, extremely aggravated at myself asking for my NPI number and full name making complaints to multiple nursing staff members.     Consultations/Discussion of results with other physicians: Discussed with ED attending physician    Disposition plan: Discharge, recommended follow-up with dermatology.  -----    Final diagnoses:   Skin complaints          Kevyn Roberson PA-C  01/10/25 [id number]

## 2025-01-22 ENCOUNTER — APPOINTMENT (OUTPATIENT)
Dept: CT IMAGING | Facility: HOSPITAL | Age: 40
End: 2025-01-22
Payer: MEDICAID

## 2025-01-22 ENCOUNTER — HOSPITAL ENCOUNTER (EMERGENCY)
Facility: HOSPITAL | Age: 40
Discharge: HOME | End: 2025-01-22
Attending: EMERGENCY MEDICINE | Admitting: EMERGENCY MEDICINE
Payer: MEDICAID

## 2025-01-22 VITALS
WEIGHT: 222 LBS | DIASTOLIC BLOOD PRESSURE: 97 MMHG | HEIGHT: 62 IN | OXYGEN SATURATION: 100 % | HEART RATE: 101 BPM | RESPIRATION RATE: 14 BRPM | BODY MASS INDEX: 40.85 KG/M2 | SYSTOLIC BLOOD PRESSURE: 161 MMHG | TEMPERATURE: 97.9 F

## 2025-01-22 DIAGNOSIS — B82.0 INTESTINAL WORMS: Primary | ICD-10-CM

## 2025-01-22 DIAGNOSIS — T14.8XXA OPEN WOUND OF SKIN: ICD-10-CM

## 2025-01-22 LAB
ALBUMIN SERPL-MCNC: 3.9 G/DL (ref 3.5–5.2)
ALBUMIN/GLOB SERPL: 1.6 G/DL
ALP SERPL-CCNC: 70 U/L (ref 39–117)
ALT SERPL W P-5'-P-CCNC: 10 U/L (ref 1–33)
ANION GAP SERPL CALCULATED.3IONS-SCNC: 10.2 MMOL/L (ref 5–15)
AST SERPL-CCNC: 14 U/L (ref 1–32)
BASOPHILS # BLD AUTO: 0.07 10*3/MM3 (ref 0–0.2)
BASOPHILS NFR BLD AUTO: 1.1 % (ref 0–1.5)
BILIRUB SERPL-MCNC: 0.2 MG/DL (ref 0–1.2)
BUN SERPL-MCNC: 13 MG/DL (ref 6–20)
BUN/CREAT SERPL: 17.1 (ref 7–25)
CALCIUM SPEC-SCNC: 8.8 MG/DL (ref 8.6–10.5)
CHLORIDE SERPL-SCNC: 103 MMOL/L (ref 98–107)
CO2 SERPL-SCNC: 22.8 MMOL/L (ref 22–29)
CREAT SERPL-MCNC: 0.76 MG/DL (ref 0.57–1)
DEPRECATED RDW RBC AUTO: 48.4 FL (ref 37–54)
EGFRCR SERPLBLD CKD-EPI 2021: 101.7 ML/MIN/1.73
EOSINOPHIL # BLD AUTO: 0.33 10*3/MM3 (ref 0–0.4)
EOSINOPHIL NFR BLD AUTO: 5.2 % (ref 0.3–6.2)
ERYTHROCYTE [DISTWIDTH] IN BLOOD BY AUTOMATED COUNT: 17.1 % (ref 12.3–15.4)
GLOBULIN UR ELPH-MCNC: 2.5 GM/DL
GLUCOSE SERPL-MCNC: 118 MG/DL (ref 65–99)
HCT VFR BLD AUTO: 33.3 % (ref 34–46.6)
HGB BLD-MCNC: 10.7 G/DL (ref 12–15.9)
IMM GRANULOCYTES # BLD AUTO: 0.02 10*3/MM3 (ref 0–0.05)
IMM GRANULOCYTES NFR BLD AUTO: 0.3 % (ref 0–0.5)
LYMPHOCYTES # BLD AUTO: 1.83 10*3/MM3 (ref 0.7–3.1)
LYMPHOCYTES NFR BLD AUTO: 28.7 % (ref 19.6–45.3)
MCH RBC QN AUTO: 25.1 PG (ref 26.6–33)
MCHC RBC AUTO-ENTMCNC: 32.1 G/DL (ref 31.5–35.7)
MCV RBC AUTO: 78 FL (ref 79–97)
MONOCYTES # BLD AUTO: 0.36 10*3/MM3 (ref 0.1–0.9)
MONOCYTES NFR BLD AUTO: 5.7 % (ref 5–12)
NEUTROPHILS NFR BLD AUTO: 3.76 10*3/MM3 (ref 1.7–7)
NEUTROPHILS NFR BLD AUTO: 59 % (ref 42.7–76)
NRBC BLD AUTO-RTO: 0 /100 WBC (ref 0–0.2)
PLATELET # BLD AUTO: 264 10*3/MM3 (ref 140–450)
PMV BLD AUTO: 10.2 FL (ref 6–12)
POTASSIUM SERPL-SCNC: 3.4 MMOL/L (ref 3.5–5.2)
PROT SERPL-MCNC: 6.4 G/DL (ref 6–8.5)
RBC # BLD AUTO: 4.27 10*6/MM3 (ref 3.77–5.28)
SODIUM SERPL-SCNC: 136 MMOL/L (ref 136–145)
WBC NRBC COR # BLD AUTO: 6.37 10*3/MM3 (ref 3.4–10.8)

## 2025-01-22 PROCEDURE — 99285 EMERGENCY DEPT VISIT HI MDM: CPT | Performed by: EMERGENCY MEDICINE

## 2025-01-22 PROCEDURE — 74177 CT ABD & PELVIS W/CONTRAST: CPT

## 2025-01-22 PROCEDURE — 25510000001 IOPAMIDOL 61 % SOLUTION: Performed by: EMERGENCY MEDICINE

## 2025-01-22 PROCEDURE — 80053 COMPREHEN METABOLIC PANEL: CPT | Performed by: PHYSICIAN ASSISTANT

## 2025-01-22 PROCEDURE — 85025 COMPLETE CBC W/AUTO DIFF WBC: CPT | Performed by: PHYSICIAN ASSISTANT

## 2025-01-22 RX ORDER — CEPHALEXIN 500 MG/1
500 CAPSULE ORAL 3 TIMES DAILY
Qty: 21 CAPSULE | Refills: 0 | Status: SHIPPED | OUTPATIENT
Start: 2025-01-22 | End: 2025-01-29

## 2025-01-22 RX ORDER — IOPAMIDOL 612 MG/ML
100 INJECTION, SOLUTION INTRAVASCULAR
Status: COMPLETED | OUTPATIENT
Start: 2025-01-22 | End: 2025-01-22

## 2025-01-22 RX ADMIN — IOPAMIDOL 100 ML: 612 INJECTION, SOLUTION INTRAVENOUS at 20:46

## 2025-01-22 RX ADMIN — CEPHALEXIN 500 MG: 250 CAPSULE ORAL at 22:24

## 2025-01-23 NOTE — ED PROVIDER NOTES
" EMERGENCY DEPARTMENT ENCOUNTER    Pt Name: Nicole Kim  MRN: [id number]  Pt :   1985  Room Number:  10/10  Date of encounter:  2025  PCP: Provider, No Known  ED Provider: Kofi Lozano PA-C    Historian: Patient      HPI:  Chief Complaint   Patient presents with    Wound Check          Context: Nicole Kim is a 39 y.o. female who presents to the ED c/o wound to left buttock and left-sided abdominal pain.  Patient states for several months she has been \"pulling worms out of my skin.\"  Has been seen at this ED twice in another ED all times diagnosed with likely side effect of her continuing to use methamphetamine confirmed on last ED visit at outside facility.  Patient comes in tonight saying she has had worms in her vomit she brings a Ziploc bag of what do appear to be worms in the bag.  Also a sore on her left buttock that she states she has seen worms coming out of as well as pus      PAST MEDICAL HISTORY  Past Medical History:   Diagnosis Date    ADD (attention deficit disorder)     COPD (chronic obstructive pulmonary disease)     Depression     Hemorrhoids     Skin tags, multiple acquired          PAST SURGICAL HISTORY  Past Surgical History:   Procedure Laterality Date    TONSILECTOMY, ADENOIDECTOMY, BILATERAL MYRINGOTOMY AND TUBES      TUBAL ABDOMINAL LIGATION           FAMILY HISTORY  Family History   Problem Relation Age of Onset    No Known Problems Mother     No Known Problems Father          SOCIAL HISTORY  Social History     Socioeconomic History    Marital status: Single   Tobacco Use    Smoking status: Every Day     Current packs/day: 1.00     Types: Cigarettes    Smokeless tobacco: Never   Vaping Use    Vaping status: Never Used   Substance and Sexual Activity    Alcohol use: No    Drug use: Yes     Types: Methamphetamines, Marijuana    Sexual activity: Defer         ALLERGIES  Patient has no known allergies.        REVIEW OF SYSTEMS  Review of Systems "   Constitutional: Negative.    HENT: Negative.     Eyes: Negative.    Respiratory: Negative.     Cardiovascular: Negative.    Gastrointestinal:         Worms in vomit, left-sided abdominal pain   Genitourinary: Negative.    Musculoskeletal: Negative.    Skin:         Sore to left buttock   Neurological: Negative.    Psychiatric/Behavioral: Negative.          All systems reviewed and negative except for those discussed in HPI.       PHYSICAL EXAM    I have reviewed the triage vital signs and nursing notes.    ED Triage Vitals [01/22/25 1917]   Temp Heart Rate Resp BP SpO2   97.9 °F (36.6 °C) 101 14 161/97 100 %      Temp src Heart Rate Source Patient Position BP Location FiO2 (%)   Oral Monitor Sitting Left arm --       Physical Exam  Vitals and nursing note reviewed.   Constitutional:       General: She is not in acute distress.     Appearance: Normal appearance. She is obese. She is not ill-appearing, toxic-appearing or diaphoretic.   HENT:      Head: Normocephalic and atraumatic.      Nose: Nose normal.   Eyes:      Extraocular Movements: Extraocular movements intact.   Cardiovascular:      Rate and Rhythm: Normal rate.   Pulmonary:      Effort: Pulmonary effort is normal.   Abdominal:      General: Abdomen is flat.      Palpations: Abdomen is soft.      Tenderness: There is no abdominal tenderness. There is no guarding or rebound.   Musculoskeletal:         General: Normal range of motion.      Cervical back: Normal range of motion.   Skin:     Comments: Several scabbed wounds to the ears face arms and buttock area consistent with excoriations and picking.  There is a area to the left buttock about 2 and half centimeters by 1 cm where patient appears to a scratch so deeply the epidermis has been scraped off.  There is no cellulitis palpable abscess fluctuance or induration.  No open wounds in this area.   Neurological:      General: No focal deficit present.      Mental Status: She is alert.   Psychiatric:          Mood and Affect: Mood normal.         Behavior: Behavior normal.            LAB RESULTS  Recent Results (from the past 24 hours)   Comprehensive Metabolic Panel    Collection Time: 01/22/25  8:31 PM    Specimen: Blood   Result Value Ref Range    Glucose 118 (H) 65 - 99 mg/dL    BUN 13 6 - 20 mg/dL    Creatinine 0.76 0.57 - 1.00 mg/dL    Sodium 136 136 - 145 mmol/L    Potassium 3.4 (L) 3.5 - 5.2 mmol/L    Chloride 103 98 - 107 mmol/L    CO2 22.8 22.0 - 29.0 mmol/L    Calcium 8.8 8.6 - 10.5 mg/dL    Total Protein 6.4 6.0 - 8.5 g/dL    Albumin 3.9 3.5 - 5.2 g/dL    ALT (SGPT) 10 1 - 33 U/L    AST (SGOT) 14 1 - 32 U/L    Alkaline Phosphatase 70 39 - 117 U/L    Total Bilirubin 0.2 0.0 - 1.2 mg/dL    Globulin 2.5 gm/dL    A/G Ratio 1.6 g/dL    BUN/Creatinine Ratio 17.1 7.0 - 25.0    Anion Gap 10.2 5.0 - 15.0 mmol/L    eGFR 101.7 >60.0 mL/min/1.73   CBC Auto Differential    Collection Time: 01/22/25  8:31 PM    Specimen: Blood   Result Value Ref Range    WBC 6.37 3.40 - 10.80 10*3/mm3    RBC 4.27 3.77 - 5.28 10*6/mm3    Hemoglobin 10.7 (L) 12.0 - 15.9 g/dL    Hematocrit 33.3 (L) 34.0 - 46.6 %    MCV 78.0 (L) 79.0 - 97.0 fL    MCH 25.1 (L) 26.6 - 33.0 pg    MCHC 32.1 31.5 - 35.7 g/dL    RDW 17.1 (H) 12.3 - 15.4 %    RDW-SD 48.4 37.0 - 54.0 fl    MPV 10.2 6.0 - 12.0 fL    Platelets 264 140 - 450 10*3/mm3    Neutrophil % 59.0 42.7 - 76.0 %    Lymphocyte % 28.7 19.6 - 45.3 %    Monocyte % 5.7 5.0 - 12.0 %    Eosinophil % 5.2 0.3 - 6.2 %    Basophil % 1.1 0.0 - 1.5 %    Immature Grans % 0.3 0.0 - 0.5 %    Neutrophils, Absolute 3.76 1.70 - 7.00 10*3/mm3    Lymphocytes, Absolute 1.83 0.70 - 3.10 10*3/mm3    Monocytes, Absolute 0.36 0.10 - 0.90 10*3/mm3    Eosinophils, Absolute 0.33 0.00 - 0.40 10*3/mm3    Basophils, Absolute 0.07 0.00 - 0.20 10*3/mm3    Immature Grans, Absolute 0.02 0.00 - 0.05 10*3/mm3    nRBC 0.0 0.0 - 0.2 /100 WBC       If labs were ordered, I independently reviewed the results and considered them in treating  the patient.        RADIOLOGY  CT Abdomen Pelvis With Contrast    Result Date: 1/22/2025  FINAL REPORT TECHNIQUE: null CLINICAL HISTORY: left flank pain COMPARISON: null FINDINGS: CT ABDOMEN AND PELVIS WITH CONTRAST COMPARISON: None. FINDINGS: Assessment for pyelonephritis is limited due to the timing of the contrast bolus. No renal calculi or hydronephrosis. No calculi in the ureters and urinary bladder. There are numerous small calcified phleboliths in the pelvis. The urinary bladder is unremarkable. Uterus and adnexa are grossly unremarkable. No evidence of a small-bowel obstruction or free air. No pericolonic inflammation. The appendix is visualized, and there is no evidence of acute appendicitis. No lymphadenopathy or loculated fluid collection. Abdominal aorta is normal in caliber without evidence of an aneurysm or dissection. Lung bases are unremarkable. Fatty liver is noted. Gallbladder is unremarkable. No visible stone. No pericholecystic inflammation. Stomach is underdistended which precludes accurate assessment. Pancreas is unremarkable. No CT evidence of acute pancreatitis. Calcified granuloma is noted in the spleen. Spleen is otherwise unremarkable. Adrenal glands are unremarkable. Bone windows demonstrate no acute abnormalities.     IMPRESSION: 1. No acute disease. No hydronephrosis or obstructing stone. 2. No evidence of a bowel obstruction or free air. No pericolonic inflammation. No evidence of appendicitis. 3. Fatty liver. 4. Additional findings are detailed above. Authenticated and Electronically Signed by Flaco Saul MD on 01/22/2025 09:29:53 PM         PROCEDURES    Procedures    Interpretations    O2 Sat: The patients oxygen saturation was 100% on Room Air.  This was independently interpreted by me as Normal    Radiology: I ordered and independently reviewed the above noted radiographic studies.  I viewed images of CT Abdomen/Pelvis which showed No intraabdominal process per my independent  interpretation. See radiologist's dictation for official interpretation.     MEDICATIONS GIVEN IN ER    Medications   cephalexin (KEFLEX) capsule 500 mg (has no administration in time range)   iopamidol (ISOVUE-300) 61 % injection 100 mL (100 mL Intravenous Given 1/22/25 2046)         MEDICAL DECISION MAKING, PROGRESS, and CONSULTS    All labs, if obtained, have been independently reviewed by me.  All radiology studies, if obtained, have been reviewed by me and the radiologist dictating the report.  All EKG's, if obtained, have been independently viewed and interpreted by me      Discussion below represents my analysis of pertinent findings related to patient's condition, differential diagnosis, treatment plan and final disposition.      Differential diagnosis:  Delusional parasitosis, methamphetamine abuse, intestinal parasitic worm, cellulitis    Additional Sources:  External (non-ED) record review:  Outside ED note from last visit at other facility showing positive methamphetamine screen, concern for delusional parasitosis.       Orders placed during this visit:  Orders Placed This Encounter   Procedures    CT Abdomen Pelvis With Contrast    Comprehensive Metabolic Panel    Urinalysis With Culture If Indicated - Urine, Clean Catch    CBC Auto Differential    CBC & Differential         Additional orders considered but not ordered:  None    ED Course:    Consultants:  None    ED Course as of 01/22/25 2210 Wed Jan 22, 2025 2154 Patient denies any urinary symptoms.  Given the fact she is found to have methamphetamine positive at last ER visit I feel as though she may be hesitant to give a sample here for urine Lum a test for methamphetamines again.  Given the fact she did bring in a baggy with worms that do appear to be consistent with some sort of roundworms that she states were in her vomit we will give her treatment with mebendazole.  Did contact pharmacy do not believe we have albendazole in house. [TM]       ED Course User Index  [TM] Kofi Lozano PA-C           After my consideration of clinical presentation and any laboratory/radiology studies obtained, I discussed the findings with the patient/patient representative who is in agreement with the treatment plan and the final disposition. Risks and benefits of discharge were discussed.     AS OF 22:10 EST VITALS:    BP - 161/97  HR - 101  TEMP - 97.9 °F (36.6 °C) (Oral)  O2 SATS - 100%    I reviewed the patients prescription monitoring report if available prior to discharge    DIAGNOSIS  Final diagnoses:   Open wound of skin   Intestinal worms         DISPOSITION  ED Disposition       ED Disposition   Discharge    Condition   Stable    Comment   --                   Please note that portions of this document were completed with voice recognition software.        Kofi Lozano PA-C  01/22/25 [id number]

## 2025-01-27 RX ORDER — ALBENDAZOLE 200 MG/1
400 TABLET, FILM COATED ORAL ONCE
Qty: 2 TABLET | Refills: 0 | Status: SHIPPED | OUTPATIENT
Start: 2025-01-27 | End: 2025-01-27

## 2025-02-05 ENCOUNTER — HOSPITAL ENCOUNTER (EMERGENCY)
Facility: HOSPITAL | Age: 40
Discharge: HOME | End: 2025-02-05
Attending: EMERGENCY MEDICINE | Admitting: EMERGENCY MEDICINE
Payer: MEDICAID

## 2025-02-05 ENCOUNTER — APPOINTMENT (OUTPATIENT)
Dept: GENERAL RADIOLOGY | Facility: HOSPITAL | Age: 40
End: 2025-02-05
Payer: MEDICAID

## 2025-02-05 VITALS
TEMPERATURE: 97.4 F | HEIGHT: 62 IN | RESPIRATION RATE: 18 BRPM | WEIGHT: 220 LBS | SYSTOLIC BLOOD PRESSURE: 148 MMHG | HEART RATE: 91 BPM | BODY MASS INDEX: 40.48 KG/M2 | OXYGEN SATURATION: 100 % | DIASTOLIC BLOOD PRESSURE: 95 MMHG

## 2025-02-05 DIAGNOSIS — F15.950 AMPHETAMINE DELUSIONAL DISORDER: Primary | ICD-10-CM

## 2025-02-05 LAB
AMPHET+METHAMPHET UR QL: POSITIVE
AMPHETAMINES UR QL: POSITIVE
BACTERIA UR QL AUTO: ABNORMAL /HPF
BARBITURATES UR QL SCN: NEGATIVE
BENZODIAZ UR QL SCN: NEGATIVE
BILIRUB UR QL STRIP: NEGATIVE
BUPRENORPHINE SERPL-MCNC: NEGATIVE NG/ML
CANNABINOIDS SERPL QL: NEGATIVE
CLARITY UR: ABNORMAL
COCAINE UR QL: NEGATIVE
COLOR UR: YELLOW
FENTANYL UR-MCNC: NEGATIVE NG/ML
GLUCOSE UR STRIP-MCNC: NEGATIVE MG/DL
HGB UR QL STRIP.AUTO: NEGATIVE
HYALINE CASTS UR QL AUTO: ABNORMAL /LPF
KETONES UR QL STRIP: ABNORMAL
LEUKOCYTE ESTERASE UR QL STRIP.AUTO: ABNORMAL
METHADONE UR QL SCN: NEGATIVE
NITRITE UR QL STRIP: NEGATIVE
OPIATES UR QL: NEGATIVE
OXYCODONE UR QL SCN: NEGATIVE
PCP UR QL SCN: NEGATIVE
PH UR STRIP.AUTO: 6 [PH] (ref 5–8)
PROT UR QL STRIP: NEGATIVE
RBC # UR STRIP: ABNORMAL /HPF
REF LAB TEST METHOD: ABNORMAL
SP GR UR STRIP: 1.01 (ref 1–1.03)
SQUAMOUS #/AREA URNS HPF: ABNORMAL /HPF
TRICYCLICS UR QL SCN: NEGATIVE
UROBILINOGEN UR QL STRIP: ABNORMAL
WBC # UR STRIP: ABNORMAL /HPF

## 2025-02-05 PROCEDURE — 99283 EMERGENCY DEPT VISIT LOW MDM: CPT | Performed by: EMERGENCY MEDICINE

## 2025-02-05 PROCEDURE — 81001 URINALYSIS AUTO W/SCOPE: CPT | Performed by: PHYSICIAN ASSISTANT

## 2025-02-05 PROCEDURE — 71045 X-RAY EXAM CHEST 1 VIEW: CPT

## 2025-02-05 PROCEDURE — 80307 DRUG TEST PRSMV CHEM ANLYZR: CPT | Performed by: PHYSICIAN ASSISTANT

## 2025-02-05 NOTE — ED PROVIDER NOTES
" EMERGENCY DEPARTMENT ENCOUNTER    Pt Name: Nicole Kim  MRN: [id number]  Pt :   1985  Room Number:    Date of encounter:  2025  PCP: Provider, No Known  ED Provider: Kofi Lozano PA-C    Historian: Patient      HPI:  Chief Complaint   Patient presents with    Vomiting          Context: Nicole Kim is a 39 y.o. female who presents to the ED c/o \"there are worms coming out of my ears out of my fingertips and my stool in the vomit and when a cough.\"  This is patient's fifth visit to the ER in 2 months.  Has been noted to use methamphetamine in the past tested positive at a recent ER visit for methamphetamine.  Patient was seen most recently by me at this facility had complaint of abdominal pain CT scan of the abdomen pelvis was performed which was unremarkable and basic labs were normal.  At that time she did bring in what appeared to be worms in a bag and she was treated with mebendazole.  Patient presents stating she could follow-up with infectious disease as she did not have insurance.  States she is here because she does not know what else to do.      PAST MEDICAL HISTORY  Past Medical History:   Diagnosis Date    ADD (attention deficit disorder)     COPD (chronic obstructive pulmonary disease)     Depression     Hemorrhoids     Skin tags, multiple acquired          PAST SURGICAL HISTORY  Past Surgical History:   Procedure Laterality Date    TONSILECTOMY, ADENOIDECTOMY, BILATERAL MYRINGOTOMY AND TUBES      TUBAL ABDOMINAL LIGATION           FAMILY HISTORY  Family History   Problem Relation Age of Onset    No Known Problems Mother     No Known Problems Father          SOCIAL HISTORY  Social History     Socioeconomic History    Marital status: Single   Tobacco Use    Smoking status: Every Day     Current packs/day: 1.00     Types: Cigarettes    Smokeless tobacco: Never   Vaping Use    Vaping status: Never Used   Substance and Sexual Activity    Alcohol use: No    " Drug use: Yes     Types: Methamphetamines, Marijuana    Sexual activity: Defer         ALLERGIES  Patient has no known allergies.        REVIEW OF SYSTEMS  Review of Systems   Constitutional: Negative.    HENT:          Worms coming out of ears   Eyes: Negative.    Respiratory:          Warms come up when coughing   Cardiovascular: Negative.    Gastrointestinal:         Worms in stool, worms in emesis   Genitourinary: Negative.    Musculoskeletal: Negative.    Skin:         Worms coming out of fingertips   Neurological: Negative.    Psychiatric/Behavioral: Negative.          All systems reviewed and negative except for those discussed in HPI.       PHYSICAL EXAM    I have reviewed the triage vital signs and nursing notes.    ED Triage Vitals   Temp Heart Rate Resp BP SpO2   02/05/25 1806 02/05/25 1753 02/05/25 1753 02/05/25 1753 02/05/25 1753   97.4 °F (36.3 °C) 91 18 139/99 100 %      Temp src Heart Rate Source Patient Position BP Location FiO2 (%)   02/05/25 1753 02/05/25 1753 -- 02/05/25 1753 --   Oral Monitor  Left arm        Physical Exam  Vitals and nursing note reviewed.   Constitutional:       General: She is not in acute distress.     Appearance: Normal appearance. She is obese. She is not ill-appearing, toxic-appearing or diaphoretic.   HENT:      Head: Normocephalic and atraumatic.      Right Ear: Tympanic membrane, ear canal and external ear normal.      Left Ear: Tympanic membrane, ear canal and external ear normal.      Ears:      Comments: TMs are intact, no worms in bilateral TMs or other foreign bodies noted     Nose: Nose normal.   Eyes:      Extraocular Movements: Extraocular movements intact.   Cardiovascular:      Rate and Rhythm: Normal rate and regular rhythm.      Heart sounds: Normal heart sounds.   Pulmonary:      Effort: Pulmonary effort is normal.      Breath sounds: Normal breath sounds.   Abdominal:      General: Abdomen is flat. Bowel sounds are normal. There is no distension.       Palpations: Abdomen is soft.      Tenderness: There is no abdominal tenderness. There is no guarding or rebound.   Musculoskeletal:         General: Normal range of motion.      Cervical back: Normal range of motion.   Skin:     General: Skin is warm and dry.      Comments: Excoriations diffusely from apparent picking and scratching at skin   Neurological:      General: No focal deficit present.      Mental Status: She is alert.   Psychiatric:         Mood and Affect: Mood normal.         Behavior: Behavior normal.            LAB RESULTS  Recent Results (from the past 24 hours)   Urinalysis With Culture If Indicated - Urine, Clean Catch    Collection Time: 02/05/25  6:46 PM    Specimen: Urine, Clean Catch   Result Value Ref Range    Color, UA Yellow Yellow, Straw    Appearance, UA Cloudy (A) Clear    pH, UA 6.0 5.0 - 8.0    Specific Gravity, UA 1.012 1.005 - 1.030    Glucose, UA Negative Negative    Ketones, UA Trace (A) Negative    Bilirubin, UA Negative Negative    Blood, UA Negative Negative    Protein, UA Negative Negative    Leuk Esterase, UA Trace (A) Negative    Nitrite, UA Negative Negative    Urobilinogen, UA 1.0 E.U./dL 0.2 - 1.0 E.U./dL   Urine Drug Screen - Urine, Clean Catch    Collection Time: 02/05/25  6:46 PM    Specimen: Urine, Clean Catch   Result Value Ref Range    THC, Screen, Urine Negative Negative    Phencyclidine (PCP), Urine Negative Negative    Cocaine Screen, Urine Negative Negative    Methamphetamine, Ur Positive (A) Negative    Opiate Screen Negative Negative    Amphetamine Screen, Urine Positive (A) Negative    Benzodiazepine Screen, Urine Negative Negative    Tricyclic Antidepressants Screen Negative Negative    Methadone Screen, Urine Negative Negative    Barbiturates Screen, Urine Negative Negative    Oxycodone Screen, Urine Negative Negative    Buprenorphine, Screen, Urine Negative Negative   Fentanyl, Urine - Urine, Clean Catch    Collection Time: 02/05/25  6:46 PM    Specimen:  Urine, Clean Catch   Result Value Ref Range    Fentanyl, Urine Negative Negative   Urinalysis, Microscopic Only - Urine, Clean Catch    Collection Time: 02/05/25  6:46 PM    Specimen: Urine, Clean Catch   Result Value Ref Range    RBC, UA None Seen None Seen, 0-2 /HPF    WBC, UA None Seen None Seen, 0-2 /HPF    Bacteria, UA Trace (A) None Seen /HPF    Squamous Epithelial Cells, UA 7-12 (A) None Seen, 0-2 /HPF    Hyaline Casts, UA None Seen None Seen /LPF    Methodology Manual Light Microscopy        If labs were ordered, I independently reviewed the results and considered them in treating the patient.        RADIOLOGY  No Radiology Exams Resulted Within Past 24 Hours        PROCEDURES    Procedures    Interpretations    O2 Sat: The patient's oxygen saturation was 100% on Room Air.  This was independently interpreted by me as Normal      MEDICATIONS GIVEN IN ER    Medications - No data to display      MEDICAL DECISION MAKING, PROGRESS, and CONSULTS    All labs, if obtained, have been independently reviewed by me.  All radiology studies, if obtained, have been reviewed by me and the radiologist dictating the report.  All EKG's, if obtained, have been independently viewed and interpreted by me      Discussion below represents my analysis of pertinent findings related to patient's condition, differential diagnosis, treatment plan and final disposition.      Differential diagnosis:    Delusional parasitosis, methamphetamine abuse    Additional Sources:  None      Orders placed during this visit:  Orders Placed This Encounter   Procedures    XR Chest 1 View    Urinalysis With Culture If Indicated - Urine, Clean Catch    Urine Drug Screen - Urine, Clean Catch    Fentanyl, Urine - Urine, Clean Catch    Urinalysis, Microscopic Only - Urine, Clean Catch         Additional orders considered but not ordered:  None    ED Course:    Consultants:  None    ED Course as of 02/05/25 1928 Wed Feb 05, 2025 1906 Methamphetamine,  Ur(!): Positive [TM]   1906 Amphetamine, Urine Qual(!): Positive [TM]   1926 Reentered examination room discussed findings with patient and she is currently using a laser pointer to point out areas on her face that seems to drawl out the worms per her report.  I cannot appreciate any abnormality in the areas of her concern.  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  She became angry and states that she takes Vyvanse and that is why she has positive methamphetamines.  Upon further discussion she then does admit to having smoked a bowl of methamphetamine within the past week.  I then explained to her my diagnosis of delusional parasitosis and given normal past workups and no findings today I feel as though her symptoms are likely due to her illicit substance abuse.  I did explain to her that on not accusing her of any wrongdoing and I do believe that she believes that she is having worms coming out of her skin and orifices however given past treatment with mebendazole normal past workups no acute exam or laboratory findings today she will need to be discharged home to follow-up outpatient and did recommend against any illicit substance abuse. [TM]      ED Course User Index  [TM] Kofi Lozano PA-C           After my consideration of clinical presentation and any laboratory/radiology studies obtained, I discussed the findings with the patient/patient representative who is in agreement with the treatment plan and the final disposition. Risks and benefits of discharge were discussed.     AS OF 19:28 EST VITALS:    BP - 148/95  HR - 91  TEMP - 97.4 °F (36.3 °C) (Oral)  O2 SATS - 100%    I reviewed the patient's prescription monitoring report if available prior to discharge    DIAGNOSIS  Final diagnoses:   Amphetamine delusional disorder         DISPOSITION  ED Disposition       ED Disposition    Discharge    Condition   Stable    Comment   --                   Please note that portions of this document were completed with voice recognition software.        Kofi Lozano PA-C  02/05/25 1920